# Patient Record
Sex: MALE | Race: WHITE | NOT HISPANIC OR LATINO | Employment: FULL TIME | URBAN - METROPOLITAN AREA
[De-identification: names, ages, dates, MRNs, and addresses within clinical notes are randomized per-mention and may not be internally consistent; named-entity substitution may affect disease eponyms.]

---

## 2019-09-23 ENCOUNTER — OFFICE VISIT (OUTPATIENT)
Dept: OTOLARYNGOLOGY | Facility: CLINIC | Age: 34
End: 2019-09-23
Payer: COMMERCIAL

## 2019-09-23 VITALS
SYSTOLIC BLOOD PRESSURE: 153 MMHG | DIASTOLIC BLOOD PRESSURE: 85 MMHG | RESPIRATION RATE: 16 BRPM | HEART RATE: 70 BPM | OXYGEN SATURATION: 96 %

## 2019-09-23 DIAGNOSIS — J34.2 DEVIATED NASAL SEPTUM: ICD-10-CM

## 2019-09-23 DIAGNOSIS — J35.01 CHRONIC TONSILLITIS: Primary | ICD-10-CM

## 2019-09-23 PROCEDURE — 99204 OFFICE O/P NEW MOD 45 MIN: CPT | Performed by: OTOLARYNGOLOGY

## 2019-09-23 NOTE — LETTER
9/23/2019         RE: Sanjiv Shelton  [address] [facility] [address]        Dear Colleague,    Thank you for referring your patient, Sanjiv Shelton, to the [facility]. Please see a copy of my visit note below.    History of Present Illness - Sanjiv Shelton is a 34 year old male here to see me for the first time due to tonsil problems.    This past September he had a severe URI and the tonsils got especially swollen and painful.  The LEFT more than RIGHT>  This is not a new symptom though, as he gets sore throats on a regular basis.  He will get tonsillitis on average 3-4 times per year for his entire adult life.  But this episode last September was particularly severe and lasted for over two months.  He was treated with antibiotics and that helped somewhat.  But since then, any loud speaking or any activity where his throat has to stretch his tonsils and throat hurt.    Otherwise no previous ENT surgery.  He does snore as well, and his spouse reports that he chokes and gasps as well.    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    Past Medical History - There is no problem list on file for this patient.      Current Medications -   Current Outpatient Medications:      doxycycline (VIBRAMYCIN) 100 MG capsule, Take 1 capsule by mouth 2 times daily., Disp: 28 capsule, Rfl: 0     PROAIR  (90 BASE) MCG/ACT IN AERS, INHALE 1 TO 2 PUFFS EVERY 4 TO 6 HOURS AS NEEDED, Disp: 1, Rfl: 1 YEAR    Allergies -   Allergies   Allergen Reactions     Sulfa Drugs      Allergic to sulfa eye gtts.       Social History -   Social History     Socioeconomic History     Marital status: Single     Spouse name: Not on file     Number of children: Not on file     Years of education: Not on file     Highest education level: Not on file   Occupational History     Not on file   Social Needs     Financial  resource strain: Not on file     Food insecurity:     Worry: Not on file     Inability: Not on file     Transportation needs:     Medical: Not on file     Non-medical: Not on file   Tobacco Use     Smoking status: Never Smoker   Substance and Sexual Activity     Alcohol use: Yes     Alcohol/week: 2.5 standard drinks     Types: 3 drink(s) per week     Drug use: Not on file     Sexual activity: Not on file   Lifestyle     Physical activity:     Days per week: Not on file     Minutes per session: Not on file     Stress: Not on file   Relationships     Social connections:     Talks on phone: Not on file     Gets together: Not on file     Attends Rastafarian service: Not on file     Active member of club or organization: Not on file     Attends meetings of clubs or organizations: Not on file     Relationship status: Not on file     Intimate partner violence:     Fear of current or ex partner: Not on file     Emotionally abused: Not on file     Physically abused: Not on file     Forced sexual activity: Not on file   Other Topics Concern     Not on file   Social History Narrative     Not on file       Family History - No family history on file.    Review of Systems - As per HPI and PMHx, otherwise 10+ system review of the head and neck, and general constitution is negative.    Physical Exam  BP (!) 153/85   Pulse 70   Resp 16   SpO2 96%     General - The patient is well nourished and well developed, and appears to have good nutritional status.  Alert and oriented to person and place, answers questions and cooperates with examination appropriately.   Head and Face - Normocephalic and atraumatic, with no gross asymmetry noted of the contour of the facial features.  The facial nerve is intact, with strong symmetric movements.  Voice and Breathing - The patient was breathing comfortably without the use of accessory muscles. There was no wheezing, stridor, or stertor.  The patients voice was clear and strong, and had  appropriate pitch and quality.  Ears - The tympanic membranes are normal in appearance, bony landmarks are intact.  No retraction, perforation, or masses.  No fluid or purulence was seen in the external canal or the middle ear. No evidence of infection of the middle ear or external canal, cerumen was normal in appearance.  Eyes - Extraocular movements intact, and the pupils were reactive to light.  Sclera were not icteric or injected, conjunctiva were pink and moist.  Mouth - Examination of the oral cavity showed pink, healthy oral mucosa. No lesions or ulcerations noted.  The tongue was mobile and midline, and the dentition were in good condition.    Throat - The walls of the oropharynx were smooth, pink, moist, symmetric, and had no lesions or ulcerations.  The tonsillar pillars and soft palate were symmetric.  The uvula was midline on elevation.  The tonsils are not that large, but scarred and pitted, with exudative crypts and tonsil stones visible  Neck - Normal midline excursion of the laryngotracheal complex during swallowing.  Full range of motion on passive movement.  Palpation of the occipital, submental, submandibular, internal jugular chain, and supraclavicular nodes did not demonstrate any abnormal lymph nodes or masses.  The carotid pulse was palpable bilaterally.  Palpation of the thyroid was soft and smooth, with no nodules or goiter appreciated.  The trachea was mobile and midline.  Nose - External contour is symmetric, no gross deflection or scars.  Nasal mucosa is pink and moist with no abnormal mucus.  The septum is notably deflected to the RIGHT.      A/P - Sanjiv Shelton is a 34 year old male  (J35.01) Chronic tonsillitis  (primary encounter diagnosis)    Based on the physical exam and history, my recommendation is for tonsillectomy (without adenoidectomy), and if he wishes and endoscopic septoplasty as well.  The remainder of the visit was spent discussing the risks and benefits of  tonsillectomy.  These included:  The risks of general anesthesia, bleeding, infection, possible need for emergency surgery to control bleeding, possible alteration of speech and swallowing, and even the possibility of continued throat problems following surgery.  They understood and wished to call in and schedule.    I did stress that this tonsillectomy is for the chronic infection and not snoring.        Again, thank you for allowing me to participate in the care of your patient.        Sincerely,        Jam Renae MD

## 2019-09-23 NOTE — PATIENT INSTRUCTIONS
Scheduling Information  To schedule your CT/MRI scan, please contact [facility] at [phone] OR [facility] at [phone]    To schedule your Surgery, please contact our Specialty Schedulers at [phone]      ENT Clinic Locations Clinic Hours Telephone Number     [facility]  [address] [place] [address]   Monday:           1:00pm -- 5:00pm    Friday:              8:00am - 12:00pm   To schedule/reschedule an appointment with   Dr. Renae,   please contact our   Specialty Scheduling Department at:     [phone]       [facility] [place]  [address]  [place] [address] Tuesday:          8:00am -- 2:00pm         Urgent Care Locations Clinic Hours Telephone Numbers     Leni Brenner  [address] [place] [address]     Monday-Friday:     11:00am - 9:00pm    Saturday-Sunday:  9:00am - 5:00pm   [phone]     [facility]  [address] [place] [address]     Monday-Friday:      5:00pm - 9:00pm     Saturday-Sunday:  9:00am - 5:00pm   [phone]

## 2019-09-23 NOTE — PROGRESS NOTES
History of Present Illness - Sanjiv Shelton is a 34 year old male here to see me for the first time due to tonsil problems.    This past September he had a severe URI and the tonsils got especially swollen and painful.  The LEFT more than RIGHT>  This is not a new symptom though, as he gets sore throats on a regular basis.  He will get tonsillitis on average 3-4 times per year for his entire adult life.  But this episode last September was particularly severe and lasted for over two months.  He was treated with antibiotics and that helped somewhat.  But since then, any loud speaking or any activity where his throat has to stretch his tonsils and throat hurt.    Otherwise no previous ENT surgery.  He does snore as well, and his spouse reports that he chokes and gasps as well.    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    Past Medical History - There is no problem list on file for this patient.      Current Medications -   Current Outpatient Medications:      doxycycline (VIBRAMYCIN) 100 MG capsule, Take 1 capsule by mouth 2 times daily., Disp: 28 capsule, Rfl: 0     PROAIR  (90 BASE) MCG/ACT IN AERS, INHALE 1 TO 2 PUFFS EVERY 4 TO 6 HOURS AS NEEDED, Disp: 1, Rfl: 1 YEAR    Allergies -   Allergies   Allergen Reactions     Sulfa Drugs      Allergic to sulfa eye gtts.       Social History -   Social History     Socioeconomic History     Marital status: Single     Spouse name: Not on file     Number of children: Not on file     Years of education: Not on file     Highest education level: Not on file   Occupational History     Not on file   Social Needs     Financial resource strain: Not on file     Food insecurity:     Worry: Not on file     Inability: Not on file     Transportation needs:     Medical: Not on file     Non-medical: Not on file   Tobacco Use     Smoking status: Never Smoker   Substance and Sexual Activity     Alcohol  use: Yes     Alcohol/week: 2.5 standard drinks     Types: 3 drink(s) per week     Drug use: Not on file     Sexual activity: Not on file   Lifestyle     Physical activity:     Days per week: Not on file     Minutes per session: Not on file     Stress: Not on file   Relationships     Social connections:     Talks on phone: Not on file     Gets together: Not on file     Attends Restoration service: Not on file     Active member of club or organization: Not on file     Attends meetings of clubs or organizations: Not on file     Relationship status: Not on file     Intimate partner violence:     Fear of current or ex partner: Not on file     Emotionally abused: Not on file     Physically abused: Not on file     Forced sexual activity: Not on file   Other Topics Concern     Not on file   Social History Narrative     Not on file       Family History - No family history on file.    Review of Systems - As per HPI and PMHx, otherwise 10+ system review of the head and neck, and general constitution is negative.    Physical Exam  BP (!) 153/85   Pulse 70   Resp 16   SpO2 96%     General - The patient is well nourished and well developed, and appears to have good nutritional status.  Alert and oriented to person and place, answers questions and cooperates with examination appropriately.   Head and Face - Normocephalic and atraumatic, with no gross asymmetry noted of the contour of the facial features.  The facial nerve is intact, with strong symmetric movements.  Voice and Breathing - The patient was breathing comfortably without the use of accessory muscles. There was no wheezing, stridor, or stertor.  The patients voice was clear and strong, and had appropriate pitch and quality.  Ears - The tympanic membranes are normal in appearance, bony landmarks are intact.  No retraction, perforation, or masses.  No fluid or purulence was seen in the external canal or the middle ear. No evidence of infection of the middle ear or  external canal, cerumen was normal in appearance.  Eyes - Extraocular movements intact, and the pupils were reactive to light.  Sclera were not icteric or injected, conjunctiva were pink and moist.  Mouth - Examination of the oral cavity showed pink, healthy oral mucosa. No lesions or ulcerations noted.  The tongue was mobile and midline, and the dentition were in good condition.    Throat - The walls of the oropharynx were smooth, pink, moist, symmetric, and had no lesions or ulcerations.  The tonsillar pillars and soft palate were symmetric.  The uvula was midline on elevation.  The tonsils are not that large, but scarred and pitted, with exudative crypts and tonsil stones visible  Neck - Normal midline excursion of the laryngotracheal complex during swallowing.  Full range of motion on passive movement.  Palpation of the occipital, submental, submandibular, internal jugular chain, and supraclavicular nodes did not demonstrate any abnormal lymph nodes or masses.  The carotid pulse was palpable bilaterally.  Palpation of the thyroid was soft and smooth, with no nodules or goiter appreciated.  The trachea was mobile and midline.  Nose - External contour is symmetric, no gross deflection or scars.  Nasal mucosa is pink and moist with no abnormal mucus.  The septum is notably deflected to the RIGHT.      A/P - Sanjiv Shelton is a 34 year old male  (J35.01) Chronic tonsillitis  (primary encounter diagnosis)    Based on the physical exam and history, my recommendation is for tonsillectomy (without adenoidectomy), and if he wishes and endoscopic septoplasty as well.  The remainder of the visit was spent discussing the risks and benefits of tonsillectomy.  These included:  The risks of general anesthesia, bleeding, infection, possible need for emergency surgery to control bleeding, possible alteration of speech and swallowing, and even the possibility of continued throat problems following surgery.  They  understood and wished to call in and schedule.    I did stress that this tonsillectomy is for the chronic infection and not snoring.

## 2019-09-24 ENCOUNTER — TELEPHONE (OUTPATIENT)
Dept: OTOLARYNGOLOGY | Facility: CLINIC | Age: 34
End: 2019-09-24

## 2019-09-24 NOTE — TELEPHONE ENCOUNTER
I spoke with patient and he has questions regarding his aftercare and 5 weeks of no strenuous exercise. 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 Will this be ok. Please call and advise. Thank you

## 2019-09-24 NOTE — TELEPHONE ENCOUNTER
Called and left a voicemail.  Surgery would be tonsillectomy and Endoscopic septoplasty with turbinate reduction.  Patient would be able to return to full intensity cardio two weeks after surgery, just no contact sports for 5 weeks after surgery.    Call if any further questions.

## 2019-10-01 ENCOUNTER — TELEPHONE (OUTPATIENT)
Dept: OTOLARYNGOLOGY | Facility: CLINIC | Age: 34
End: 2019-10-01

## 2019-10-02 NOTE — TELEPHONE ENCOUNTER
Type of surgery: ENDOSCOPIC SEPTOPLASTY NOSE WITH TURBINOPLASTY  CPT 52268, 09620  Chronic tonsillitis [J35.01]  - Primary   Location of surgery: [facility]  Date and time of surgery: 11/26/2019  Surgeon: ALVARO  Pre-Op Appt Date: Patient will scheduled with Emmy Burr   Post-Op Appt Date: 12/02/2019   Packet sent out: Yes  Pre-cert/Authorization completed:   Per Availity:  Procedure Code 1  55049    Reference Number  [id number]  Status  NO AUTH REQUIRED    Medical Policy Information or Criteria  XI-03 Site of Service for Selected Outpatient Procedures    Procedure Code 1  99479    Reference Number  [id number]  Status  NO AUTH REQUIRED    Date: 10/03/2019    Thank you,   Massiel Smith   Referral Department  [phone]

## 2019-11-25 ENCOUNTER — ANESTHESIA EVENT (OUTPATIENT)
Dept: SURGERY | Facility: AMBULATORY SURGERY CENTER | Age: 34
End: 2019-11-25

## 2019-11-25 ASSESSMENT — MIFFLIN-ST. JEOR: SCORE: 2004

## 2019-11-26 ENCOUNTER — ANESTHESIA (OUTPATIENT)
Dept: SURGERY | Facility: AMBULATORY SURGERY CENTER | Age: 34
End: 2019-11-26
Payer: COMMERCIAL

## 2019-11-26 ENCOUNTER — NURSE TRIAGE (OUTPATIENT)
Dept: NURSING | Facility: CLINIC | Age: 34
End: 2019-11-26

## 2019-11-26 ENCOUNTER — HOSPITAL ENCOUNTER (OUTPATIENT)
Facility: AMBULATORY SURGERY CENTER | Age: 34
Discharge: HOME | End: 2019-11-26
Attending: OTOLARYNGOLOGY | Admitting: OTOLARYNGOLOGY
Payer: COMMERCIAL

## 2019-11-26 VITALS
TEMPERATURE: 97 F | HEIGHT: 72 IN | BODY MASS INDEX: 30.49 KG/M2 | OXYGEN SATURATION: 97 % | RESPIRATION RATE: 16 BRPM | DIASTOLIC BLOOD PRESSURE: 85 MMHG | HEART RATE: 99 BPM | WEIGHT: 225.09 LBS | SYSTOLIC BLOOD PRESSURE: 134 MMHG

## 2019-11-26 DIAGNOSIS — J35.01 CHRONIC TONSILLITIS: ICD-10-CM

## 2019-11-26 DIAGNOSIS — J34.2 DEVIATED NASAL SEPTUM: ICD-10-CM

## 2019-11-26 PROCEDURE — 30520 REPAIR OF NASAL SEPTUM: CPT | Performed by: OTOLARYNGOLOGY

## 2019-11-26 PROCEDURE — 42826 REMOVAL OF TONSILS: CPT

## 2019-11-26 PROCEDURE — 30520 REPAIR OF NASAL SEPTUM: CPT

## 2019-11-26 PROCEDURE — G8916 PT W IV AB GIVEN ON TIME: HCPCS

## 2019-11-26 PROCEDURE — 42826 REMOVAL OF TONSILS: CPT | Mod: 51 | Performed by: OTOLARYNGOLOGY

## 2019-11-26 PROCEDURE — 30140 RESECT INFERIOR TURBINATE: CPT | Mod: 50 | Performed by: OTOLARYNGOLOGY

## 2019-11-26 PROCEDURE — G8907 PT DOC NO EVENTS ON DISCHARG: HCPCS

## 2019-11-26 PROCEDURE — 88304 TISSUE EXAM BY PATHOLOGIST: CPT | Performed by: OTOLARYNGOLOGY

## 2019-11-26 PROCEDURE — 30140 RESECT INFERIOR TURBINATE: CPT | Mod: 50

## 2019-11-26 RX ORDER — PROPOFOL 10 MG/ML
INJECTION, EMULSION INTRAVENOUS PRN
Status: DISCONTINUED | OUTPATIENT
Start: 2019-11-26 | End: 2019-11-26

## 2019-11-26 RX ORDER — MEPERIDINE HYDROCHLORIDE 25 MG/ML
12.5 INJECTION INTRAMUSCULAR; INTRAVENOUS; SUBCUTANEOUS
Status: DISCONTINUED | OUTPATIENT
Start: 2019-11-26 | End: 2019-11-27 | Stop reason: HOSPADM

## 2019-11-26 RX ORDER — OXYCODONE HYDROCHLORIDE 5 MG/1
5 TABLET ORAL EVERY 4 HOURS PRN
Qty: 42 TABLET | Refills: 0 | Status: SHIPPED | OUTPATIENT
Start: 2019-11-26 | End: 2019-11-26

## 2019-11-26 RX ORDER — GABAPENTIN 300 MG/1
300 CAPSULE ORAL ONCE
Status: COMPLETED | OUTPATIENT
Start: 2019-11-26 | End: 2019-11-26

## 2019-11-26 RX ORDER — FENTANYL CITRATE 50 UG/ML
25-50 INJECTION, SOLUTION INTRAMUSCULAR; INTRAVENOUS
Status: DISCONTINUED | OUTPATIENT
Start: 2019-11-26 | End: 2019-11-27 | Stop reason: HOSPADM

## 2019-11-26 RX ORDER — OXYCODONE HYDROCHLORIDE 5 MG/1
5-10 TABLET ORAL EVERY 4 HOURS PRN
Status: DISCONTINUED | OUTPATIENT
Start: 2019-11-26 | End: 2019-11-27 | Stop reason: HOSPADM

## 2019-11-26 RX ORDER — LIDOCAINE 40 MG/G
CREAM TOPICAL
Status: DISCONTINUED | OUTPATIENT
Start: 2019-11-26 | End: 2019-11-27 | Stop reason: HOSPADM

## 2019-11-26 RX ORDER — HYDROMORPHONE HYDROCHLORIDE 1 MG/ML
.3-.5 INJECTION, SOLUTION INTRAMUSCULAR; INTRAVENOUS; SUBCUTANEOUS EVERY 10 MIN PRN
Status: DISCONTINUED | OUTPATIENT
Start: 2019-11-26 | End: 2019-11-27 | Stop reason: HOSPADM

## 2019-11-26 RX ORDER — FENTANYL CITRATE 50 UG/ML
INJECTION, SOLUTION INTRAMUSCULAR; INTRAVENOUS PRN
Status: DISCONTINUED | OUTPATIENT
Start: 2019-11-26 | End: 2019-11-26

## 2019-11-26 RX ORDER — CEFAZOLIN SODIUM 2 G/100ML
2 INJECTION, SOLUTION INTRAVENOUS
Status: COMPLETED | OUTPATIENT
Start: 2019-11-26 | End: 2019-11-26

## 2019-11-26 RX ORDER — SODIUM CHLORIDE, SODIUM LACTATE, POTASSIUM CHLORIDE, CALCIUM CHLORIDE 600; 310; 30; 20 MG/100ML; MG/100ML; MG/100ML; MG/100ML
INJECTION, SOLUTION INTRAVENOUS CONTINUOUS
Status: DISCONTINUED | OUTPATIENT
Start: 2019-11-26 | End: 2019-11-27 | Stop reason: HOSPADM

## 2019-11-26 RX ORDER — ONDANSETRON 2 MG/ML
INJECTION INTRAMUSCULAR; INTRAVENOUS PRN
Status: DISCONTINUED | OUTPATIENT
Start: 2019-11-26 | End: 2019-11-26

## 2019-11-26 RX ORDER — CEFAZOLIN SODIUM 1 G/3ML
1 INJECTION, POWDER, FOR SOLUTION INTRAMUSCULAR; INTRAVENOUS SEE ADMIN INSTRUCTIONS
Status: DISCONTINUED | OUTPATIENT
Start: 2019-11-26 | End: 2019-11-27 | Stop reason: HOSPADM

## 2019-11-26 RX ORDER — ALBUTEROL SULFATE 0.83 MG/ML
2.5 SOLUTION RESPIRATORY (INHALATION) EVERY 4 HOURS PRN
Status: DISCONTINUED | OUTPATIENT
Start: 2019-11-26 | End: 2019-11-27 | Stop reason: HOSPADM

## 2019-11-26 RX ORDER — ONDANSETRON 8 MG/1
8 TABLET, ORALLY DISINTEGRATING ORAL EVERY 8 HOURS PRN
Qty: 20 TABLET | Refills: 1 | Status: SHIPPED | OUTPATIENT
Start: 2019-11-26 | End: 2020-06-01

## 2019-11-26 RX ORDER — OXYCODONE HYDROCHLORIDE 5 MG/1
5 TABLET ORAL EVERY 4 HOURS PRN
Qty: 42 TABLET | Refills: 0 | Status: SHIPPED | OUTPATIENT
Start: 2019-11-26 | End: 2020-06-01

## 2019-11-26 RX ORDER — KETOROLAC TROMETHAMINE 30 MG/ML
30 INJECTION, SOLUTION INTRAMUSCULAR; INTRAVENOUS EVERY 6 HOURS PRN
Status: DISCONTINUED | OUTPATIENT
Start: 2019-11-26 | End: 2019-11-27 | Stop reason: HOSPADM

## 2019-11-26 RX ORDER — DEXAMETHASONE SODIUM PHOSPHATE 4 MG/ML
4 INJECTION, SOLUTION INTRA-ARTICULAR; INTRALESIONAL; INTRAMUSCULAR; INTRAVENOUS; SOFT TISSUE EVERY 10 MIN PRN
Status: DISCONTINUED | OUTPATIENT
Start: 2019-11-26 | End: 2019-11-27 | Stop reason: HOSPADM

## 2019-11-26 RX ORDER — ONDANSETRON 2 MG/ML
4 INJECTION INTRAMUSCULAR; INTRAVENOUS EVERY 30 MIN PRN
Status: DISCONTINUED | OUTPATIENT
Start: 2019-11-26 | End: 2019-11-27 | Stop reason: HOSPADM

## 2019-11-26 RX ORDER — AMOXICILLIN AND CLAVULANATE POTASSIUM 600; 42.9 MG/5ML; MG/5ML
600 POWDER, FOR SUSPENSION ORAL 2 TIMES DAILY
Qty: 70 ML | Refills: 0 | Status: SHIPPED | OUTPATIENT
Start: 2019-11-26 | End: 2019-12-03

## 2019-11-26 RX ORDER — LIDOCAINE HYDROCHLORIDE AND EPINEPHRINE 10; 10 MG/ML; UG/ML
INJECTION, SOLUTION INFILTRATION; PERINEURAL PRN
Status: DISCONTINUED | OUTPATIENT
Start: 2019-11-26 | End: 2019-11-26 | Stop reason: HOSPADM

## 2019-11-26 RX ORDER — ONDANSETRON 4 MG/1
4 TABLET, ORALLY DISINTEGRATING ORAL EVERY 30 MIN PRN
Status: DISCONTINUED | OUTPATIENT
Start: 2019-11-26 | End: 2019-11-27 | Stop reason: HOSPADM

## 2019-11-26 RX ORDER — LIDOCAINE HYDROCHLORIDE 20 MG/ML
INJECTION, SOLUTION INFILTRATION; PERINEURAL PRN
Status: DISCONTINUED | OUTPATIENT
Start: 2019-11-26 | End: 2019-11-26

## 2019-11-26 RX ORDER — ACETAMINOPHEN 325 MG/1
975 TABLET ORAL ONCE
Status: COMPLETED | OUTPATIENT
Start: 2019-11-26 | End: 2019-11-26

## 2019-11-26 RX ORDER — DEXAMETHASONE SODIUM PHOSPHATE 4 MG/ML
10 INJECTION, SOLUTION INTRA-ARTICULAR; INTRALESIONAL; INTRAMUSCULAR; INTRAVENOUS; SOFT TISSUE ONCE
Status: COMPLETED | OUTPATIENT
Start: 2019-11-26 | End: 2019-11-26

## 2019-11-26 RX ORDER — LIDOCAINE HYDROCHLORIDE AND EPINEPHRINE BITARTRATE 20; .01 MG/ML; MG/ML
INJECTION, SOLUTION SUBCUTANEOUS PRN
Status: DISCONTINUED | OUTPATIENT
Start: 2019-11-26 | End: 2019-11-26 | Stop reason: HOSPADM

## 2019-11-26 RX ORDER — NALOXONE HYDROCHLORIDE 0.4 MG/ML
.1-.4 INJECTION, SOLUTION INTRAMUSCULAR; INTRAVENOUS; SUBCUTANEOUS
Status: DISCONTINUED | OUTPATIENT
Start: 2019-11-26 | End: 2019-11-27 | Stop reason: HOSPADM

## 2019-11-26 RX ADMIN — PROPOFOL 200 MG: 10 INJECTION, EMULSION INTRAVENOUS at 13:24

## 2019-11-26 RX ADMIN — ONDANSETRON 4 MG: 2 INJECTION INTRAMUSCULAR; INTRAVENOUS at 14:33

## 2019-11-26 RX ADMIN — DEXAMETHASONE SODIUM PHOSPHATE 10 MG: 4 INJECTION, SOLUTION INTRA-ARTICULAR; INTRALESIONAL; INTRAMUSCULAR; INTRAVENOUS; SOFT TISSUE at 13:38

## 2019-11-26 RX ADMIN — GABAPENTIN 300 MG: 300 CAPSULE ORAL at 12:34

## 2019-11-26 RX ADMIN — CEFAZOLIN SODIUM 2 G: 2 INJECTION, SOLUTION INTRAVENOUS at 13:20

## 2019-11-26 RX ADMIN — FENTANYL CITRATE 50 MCG: 50 INJECTION, SOLUTION INTRAMUSCULAR; INTRAVENOUS at 13:23

## 2019-11-26 RX ADMIN — SODIUM CHLORIDE, SODIUM LACTATE, POTASSIUM CHLORIDE, CALCIUM CHLORIDE: 600; 310; 30; 20 INJECTION, SOLUTION INTRAVENOUS at 13:15

## 2019-11-26 RX ADMIN — ACETAMINOPHEN 975 MG: 325 TABLET ORAL at 12:34

## 2019-11-26 RX ADMIN — ONDANSETRON 4 MG: 2 INJECTION INTRAMUSCULAR; INTRAVENOUS at 15:52

## 2019-11-26 RX ADMIN — OXYCODONE HYDROCHLORIDE 5 MG: 5 TABLET ORAL at 15:32

## 2019-11-26 RX ADMIN — Medication 1 MG: at 13:45

## 2019-11-26 RX ADMIN — Medication 50 MG: at 13:24

## 2019-11-26 RX ADMIN — LIDOCAINE HYDROCHLORIDE 3 ML: 20 INJECTION, SOLUTION INFILTRATION; PERINEURAL at 13:24

## 2019-11-26 RX ADMIN — FENTANYL CITRATE 50 MCG: 50 INJECTION, SOLUTION INTRAMUSCULAR; INTRAVENOUS at 13:36

## 2019-11-26 ASSESSMENT — LIFESTYLE VARIABLES: TOBACCO_USE: 0

## 2019-11-26 NOTE — ANESTHESIA POSTPROCEDURE EVALUATION
Anesthesia POST Procedure Evaluation    Patient: Sanjiv Shelton   MRN:     [id number] Gender:   male   Age:    34 year old :      1985        Preoperative Diagnosis: Chronic tonsillitis [J35.01]  Deviated nasal septum [J34.2]   Procedure(s):  ENDOSCOPIC SEPTOPLASTY, NOSE, WITH TURBINOPLASTY  Bilateral Tonsillectomy   Postop Comments: No value filed.       Anesthesia Type:  Not documented  General    Reportable Event: NO     PAIN: Uncomplicated   Sign Out status: Comfortable, Well controlled pain     PONV: No PONV   Sign Out status:  No Nausea or Vomiting     Neuro/Psych: Uneventful perioperative course   Sign Out Status: Preoperative baseline; Age appropriate mentation     Airway/Resp.: Uneventful perioperative course   Sign Out Status: Non labored breathing, age appropriate RR; Resp. Status within EXPECTED Parameters     CV: Uneventful perioperative course   Sign Out status: Appropriate BP and perfusion indices; Appropriate HR/Rhythm     Disposition:   Sign Out in:  PACU  Disposition:  Phase II; Home  Recovery Course: Uneventful  Follow-Up: Not required           Last Anesthesia Record Vitals:  CRNA VITALS  2019 1428 - 2019 1519      2019             NIBP:  (!) 158/116    Pulse:  86    NIBP Mean:  136    SpO2:  100 %    Resp Rate (observed):  22          Last PACU Vitals:  Vitals Value Taken Time   /103 2019  3:15 PM   Temp 97.4  F (36.3  C) 2019  3:00 PM   Pulse 92 2019  3:15 PM   Resp 8 2019  3:18 PM   SpO2 100 % 2019  3:18 PM   Temp src     NIBP     Pulse     SpO2     Resp     Temp     Ht Rate     Temp 2     Vitals shown include unvalidated device data.      Electronically Signed By: Spencer Lozoya DO, 2019, 3:19 PM

## 2019-11-26 NOTE — ANESTHESIA CARE TRANSFER NOTE
Patient: Sanjiv Shelton    Procedure(s):  ENDOSCOPIC SEPTOPLASTY, NOSE, WITH TURBINOPLASTY  Bilateral Tonsillectomy    Diagnosis: Chronic tonsillitis [J35.01]  Deviated nasal septum [J34.2]  Diagnosis Additional Information: No value filed.    Anesthesia Type:   General     Note:  Airway :Face Mask  Patient transferred to:PACU  Comments: Awake, comfortable, sats 99%< Report to RN.Handoff Report: Identifed the Patient, Identified the Reponsible Provider, Reviewed the pertinent medical history, Discussed the surgical course, Reviewed Intra-OP anesthesia mangement and issues during anesthesia, Set expectations for post-procedure period and Allowed opportunity for questions and acknowledgement of understanding      Vitals: (Last set prior to Anesthesia Care Transfer)    CRNA VITALS  11/26/2019 1428 - 11/26/2019 1501      11/26/2019             NIBP:  (!) 158/116    Pulse:  86    NIBP Mean:  136    SpO2:  100 %    Resp Rate (observed):  22                Electronically Signed By: KATERYNA Jasso CRNA  November 26, 2019  3:01 PM

## 2019-11-26 NOTE — OP NOTE
PREOPERATIVE DIAGNOSES:   1. Deviated nasal septum.   2. Turbinate hypertrophy.   3. Nasal obstruction.   4. Chronic tonsillitis    POSTOPERATIVE DIAGNOSES:   1. Deviated nasal septum.   2. Turbinate hypertrophy.   3. Nasal obstruction.   4. Chronic tonsillitis    PROCEDURES PERFORMED:   1. Endoscopically assisted septoplasty with reimplantation of cartilage.   2. Bilateral submucous resection of inferior turbinates.   3. Bilateral tonsillectomy    SURGEON: Jam Renae MD   ASSISTANT: None  BLOOD LOSS: 10 mL.   COMPLICATIONS: None.   SPECIMENS: None.   IMPLANTS: None  ANESTHESIA: GETA.   INDICATIONS: Sanjiv Shelton  presented to me with a lifelong history of chronic nasal obstruction as well as chronic tonsillitis.  On evaluation, the patient had severely deviated septum and turbinate hypertrophy. Therefore, my recommendation was for the above-named procedures. Preoperatively, risks discussed included the risks of infection, bleeding, the risks of general anesthesia, possible injury to the eyes, base of skull and tear duct system, and possible failure of the surgery to achieve the desired result. The patient understood these risks and possible outcomes and wished to proceed.   OPERATIVE PROCEDURE: After being taken to the operating room and induction of general endotracheal tube anesthesia, the bed was rotated 90 degrees.  After that, he was prepped and draped in the normal clean fashion. I began by applying topical anesthetic in the form of 2 cottonoids on each side of the nose which had been soaked with a total of 4 mL of 4% liquid cocaine. In addition, I injected 0.25% Marcaine with 1:100,000 epinephrine into the base of the columella as well as the anterior insertion of the inferior turbinates bilaterally in preparation for later submucous resection.    We began with the tonsillectomy,a shoulder roll and head turban were placed. I suspended the patient from the [place] stand using a Jaylene-Ventura  mouthgag, and I grasped the right tonsil with an Allis forceps and retracted medially and performed subcapsular dissection utilizing monopolar cautery, and the right tonsil came out very smoothly. I then turned my attention to the left side, once again using an Allis forceps to grasp it and retract it medially, and then I performed subcapsular dissection, and the left tonsil also came out very smoothly. I released the mouthgag for 2 minutes to allow recirculation of blood to the tongue.   I resuspended the patient from the [place] stand using a Jaylene-Ventura mouthgag,  and there was good hemostasis. But at this point I noted that in removing the tonsils thoroughly the uvula had been undermined and was already turning quite dusky so I amputated it at the base and over stitched the stump with a vicryl.   I applied a thin film of the hemostatic powder to the tonsil beds bilaterally and removed the mouthgag. The bed was rotated 90 degrees after I removed the shoulder roll and head turban, and now moved on to the nasal portion of the surgery.    I removed the cottonoids from the right side of the nose and entered the right nasal cavity.   I made a septoplasty incision in the right nasal vestibule in a traditional open fashion. I then dissected down onto the left side of the cartilaginous septum through the right-sided incision. I then was able to start a mucoperichondrial pocket directly on the left side of the cartilaginous septum and inserted 0-degree endoscope to form my pocket and under direct endoscopic visualization. After I completely elevated the mucoperichondrium off the left side of the septum, I then made a hemitransfixion incision through the cartilage approximately 1.5 cm back from the anterior edge. I broke over to the right side and raised a submucoperiosteal flap on the entire right side of the nasal septum under direct endoscopic visualization. I was able to carefully tease the mucoperichondrium off the large  rightward-pointing spur. After this was done, I used a swivel knife to remove the entire rhomboid portion of the cartilaginous septum, and I removed it in one large piece. It was brought to the back table and crushed in 2 pieces and then reinserted back into the mucoperichondrial pocket. I laid the flaps back together and this significantly improved the left nasal airway. I then closed my septoplasty incision with 3 simple interrupted 4-0 chromic gut sutures.     I proceeded to the final components of the operation, which was submucous resection of inferior turbinates. I switched to a 2 inferior turbinate blade and started on the left side. I made a stab incision at the anterior insertion of the left inferior turbinate and raised a submucoperiosteal tunnel along the medial surface of the left inferior turbinate bone. I then slowly withdrew the shaver blade as I ran the shaver to perform my submucous resection.   I then performed the same procedure on the right side, once again using the 2 mm turbinate blade to make a stab incision at the anterior insertion of the right inferior turbinate blade. I raised a submucoperiosteal tunnel along the entire medial surface of the right inferior turbinate bone and slowly withdrew the shaver as I ran the shaver function to perform submucous resection.   At this point, the entire procedure was now complete. I reinspected both sides of the nose and there was good hemostasis.  All instruments were accounted for and all counts were correct. The patient's bed was rotated 90 degrees back to the care of anesthesia. He was awakened, extubated and sent to the recovery room in good condition.

## 2019-11-26 NOTE — DISCHARGE INSTRUCTIONS
[facility]  Same-Day Surgery   Adult Discharge Orders & Instructions   For 24 hours after surgery  1. Get plenty of rest.  A responsible adult must stay with you for at least 24 hours after you leave the hospital.   2. Do not drive or use heavy equipment.  If you have weakness or tingling, don't drive or use heavy equipment until this feeling goes away.  3. Do not drink alcohol.  4. Avoid strenuous or risky activities.  Ask for help when climbing stairs.   5. You may feel lightheaded.  IF so, sit for a few minutes before standing.  Have someone help you get up.   6. If you have nausea (feel sick to your stomach): Drink only clear liquids such as apple juice, ginger ale, broth or 7-Up.  Rest may also help.  Be sure to drink enough fluids.  Move to a regular diet as you feel able.  7. You may have a slight fever. Call the doctor if your fever is over 100 F (37.7 C) (taken under the tongue) or lasts longer than 24 hours.  8. You may have a dry mouth, a sore throat, muscle aches or trouble sleeping.  These should go away after 24 hours.  9. Do not make important or legal decisions.   Call your doctor for any of the followin.  Signs of infection (fever, growing tenderness at the surgery site, a large amount of drainage or bleeding, severe pain, foul-smelling drainage, redness, swelling).    2. It has been over 8 to 10 hours since surgery and you are still not able to urinate (pass water).    3.  Headache for over 24 hours.         To contact Dr Yoon call:  [phone]    Tylenol was given at 12:30 PM  Instructions for Septoplasty    Recovery - Everyone recovers differently from a general anesthetic.  Symptoms such as fatigue, nausea, lightheadedness, and sometimes a low grade fever (up to 100 degrees) are not unusual.  As your body removes the anesthetic drugs from circulation, these symptoms will resolve.  Your nose will be sore and throbbing after surgery, and you may even have symptoms  similar to a sinus infection with headache, congestion, and pressure.  These will resolve with healing.  For several days you may experience bloody drainage from the nose, please use the drip pad as necessary for this.  If there is persistent bleeding, please call the office during business hours or the on call ENT physician after hours.  It is common for the front teeth to ache after septoplasty and sinus surgery.  This resolves with healing.  There are no diet restrictions after septoplasty, and you can resume your home medications.  Please blow your nose very very gently for two weeks after surgery.  Limit your activity to no strenuous activities until I see you for the first follow up visit, and sleep with your head elevated.    Medications - You were sent home with narcotic pain medication.  If you can tolerate the discomfort during your recovery by using just plain Tylenol or ibuprofen (advil), please do so.  However, do not hesitate to use the stronger pain medication if needed.  If you were sent home with an antibiotic, it is primarily used to help the healing process.  If it causes loose bowel movements or other signs of intolerance, it is appropriate to discontinue it.      Complications - Problems related to septoplasty almost always are detected during the operation, and special instruction will be given in that situation.  However, unexpected things can happen.  If you experience persistent bleeding, fevers, changes in vision, and severe headaches, this may be the sign of an infection.  Any of these symptoms should be called into my office or to the on call ENT if after hours.   The most common non-emergency distant complication of septoplasty is the septum healing crooked.  Although rare, this can happen.  There may be small plastic pieces placed inside your nose during surgery (splints).  These help to promote the septum into healing in its straightened position, and will be removed at the follow up  visit.    Follow up - As mentioned, splints may be removed at the follow up visit.  This is not as bad as it sounds.  And afterwards, the improvement you will expereince in breathing from the septoplasty is usually dramatic and immediate.  I will then see you 4 to 6 weeks after that visit to make sure that everything has healed appropriately.    If there are any questions or issues with the above, or if there are other issues that concern you, always feel free to call the clinic and I am happy to speak with you as soon as I can.    Ramu Renae MD  Otolaryngology  [facility]  [phone] After hours, [facility] option    Postoperative Care for Tonsillectomy (with or without adenoidectomy)    Recovery - There are a handful of issues that routinely occur during recover that should be anticipated during your recovery.    1. The pain and swelling almost always gets worse before it gets better, this is normal.  Usually it peaks 3 to 5 days after the surgery, and then begins improving at 7 to 8 days after surgery.  Of course, this is variable from person to person.  2. The only dietary restriction is avoidance of hard or crunchy things until I see you in follow up.  If it makes a noise when you bite it, it is too hard.  Although it is good to begin eating again from day one, it is not unusual to not eat for several days after the procedure.  The most important thing is staying hydrated.  Drink fluids with electrolytes if possible, such as sports drinks.  3. The pain medication you were sent home with can make some people very nauseated.  To minimize this, avoid taking it on an empty stomach, or take smaller does with greater frequency.  For example if your dose is 2 teaspoons every four hours, try taking one teaspoon every two hours, etc.  4. Antibiotic are sometimes given after surgery, not to prevent infection, but some research shows that it helps to decrease pain.  This is not absolutely  proven, and therefore is not absolutely necessary.   5. Try to stay ahead of the pain.  In other words, do not wait for pain medication to completely wear off before taking more pain medicine.  Instead, take the medication every 4 to 6 hours, even if it requires setting an alarm clock at night.  This is especially helpful during the first 5 days.  6. The uvula ( the small hanging object in the back of your mouth) frequently swells up after tonsillectomy, but will go back to normal.  This swelling can temporarily cause the sensation of something being stuck in your throat, it will go away with recovery.  Also, because of the arrangement of nerves under where the tonsils were, sharp ear pain is very common during recovery, and will also go away with recovery.   7. With adenoidectomy, a very strong and foul-smelling odor can occur about 4-7 days after surgery.  This fades rapidly, and unless there is an associated fever no antibiotics are necessary.  8. It is very common after tonsillectomy to experience ear pain. This is due to nerves on the side of the throat becoming inflamed, and causing the perception of sudden episodes of ear pain.  This can be controlled with the same pain medication given for the surgery.     Activity - Avoid heavy lifting (greater than 20 pounds), strenuous exercise, or extremely cold environments until the follow up appointment.  Also, try to sleep with your head elevated.  An irritated cough from the breathing tube is fairly normal after surgery.    Medications - Except blood thinners, almost all medication can be re-started after tonsillectomy.      Complications - Bleeding is by far the most common complication after tonsillectomy.  If there are a few small drops or streaks of blood in the saliva that then goes away, this can be conservatively watched.  Gentle gargling with the ice water can also help stop this minor bleeding.  However, if the bleeding is persistent, or heavy bleeding  occurs, do not hesitate.  Go to the emergency room to be evaluated.    Follow up - I like to see my patients about 2 weeks after the procedure to make sure that everything is healing appropriately.  Occasionally, there can be some longer - lasting side effects of surgery such as abnormal tongue sensations, or unusual swallowing.  However, if everything is healing well, the 2 week postoperative visit is all that will be necessary.    If there are any questions or issues with the above, or if there are other issues that concern you, always feel free to call the clinic and I am happy to speak with you as soon as I can.    Ramu Renae MD   Otolaryngology  [facility]  [phone] After hours, [facility] option      Tonsillectomy and Adenoidectomy    What is a tonsillectomy and adenoidectomy?    Tonsillectomy is removal of the tonsils. Adenoidectomy is removal of the adenoids. Tonsils and adenoids are lumps of tissue at the back of the throat. The tonsils and adenoids fight infection. Your child may need the tonsillectomy if he has many bad colds, sore throats, or ear infections. Tonsillectomy and adenoidectomy (T&A) are often done together.    What can I expect after Surgery?    It is common to have an upset stomach and vomiting during the first 24 hours after surgery.    Your child s throat may be sore for two weeks, especially when eating. The soreness may get better after a few days and then get worse again. Your child s voice may change a little after surgery.    Ear pain is common, often when swallowing, because the ear and throat have a common sensory nerve. Jaw spasms, or uncontrollable movement of the jaw, may also occur and cause pain.    Neck soreness is common after an adenoidectomy and usually last about one week.    Your child will have bad breath for a few weeks because your child s throat is swollen, snoring is common after surgery but should go away after about two  weeks.    How should I care for my child?    Encourage your child to drink plenty of liquids (at least 2 -3 ounces per hour)  keeping the throat moist decreases discomfort and prevents dehydration( a  dangerous condition in which the body gets dried out.)    Give pain medication regularly within the limits directed by your doctor. Give  it before bed and first thing after waking in the morning. Try to give the   pain medication 30 minutes before meals to help make swallowing easier.    To prevent bleeding, avoid coughing, nose-blowing, clearing throat, and   spitting. Wipe nose gently if needed. When sneezing, encourage your child to   Open the mouth and make a sound, to prevent pressure buildup.    Avoid coming in contact with people who have colds, flu, or infections.      What can my child eat?    The day of surgery, give only cool, Clear liquids such as:    ? Apple Juice  ? Jell-o*  ? Gene-aid*  ? Popsicles*  ? Flat pop (remove bubbles)  ? Water    If your child has an upset stomach, give small amounts often. Note: If   Your child vomits after drinking red liquids the vomit will be the same  color.    After the first day, when your child wants them add dairy and soft foods such as:  ? Ice cream  ? Milk shakes(use spoon)  ? Pudding  ? Smooth yogurt  Liquids are more important than food.    Be sure your child is drinking a lot.    When your child wants them, add soft foods (foods without rough  edges). See the chart for ideas. If a food is not on the list ask yourself:    Is it easy to chew? Is it free of coarse, rough, or crispy edges?  If the answer  is yes, your child can probably eat it.    Be sure to cut foods very small and encourage your child to chew them  well. Continue the soft diet for 2 weeks after surgery Avoid citrus fruits and juices   such as orange juice and lemonade, as they may sting your child s throat. Avoid  foods that are hot in temperature or spicy hot.                               May Eat  Should not eat   - Soft bread  - Soggy waffles or   Spanish toast (no crusts).  Soaked in syrup  - Pancakes  - Scrambled or   poached egg   - Toast  - Crispy waffles  - Fried foods   - Oatmeal,or   Creamy cereal  - Soggy cold cereal  (soaked in milk   - Crunchy cold   cereal   - Soup  - Hot dogs  - Hamburgers  - Tender, moist  meat  - Pasta, noodles  - Spaghetti-Os  - Macaroni and  Cheese   - Tough, dry meat,  chicken or fish   - Milk  - Custard, pudding  - Ice cream  - Malts, shakes  - Yogurt (smooth)  - Cottage cheese   - Cookies  - Crackers  - Pretzels  - Chips  - Popcorn  - Nuts   - Sandwiches, (no crusts)  - Smooth peanut butter   and jelly  - Processed cheese  - Tuna - Grilled cheese  sandwiches   - Cooked vegetables  - Mashed potatoes - Raw vegetables   - Tomatoes   - Applesauce  - Bananas  - Canned fruits  - Watermelon with out  seeds - Citrus fruits  - Moist fresh fruits   - Juices (not citrus)  - Gene aid  - Flat pop (no bubbles)  - Jell-O - Citrus juices  - Pop with bubbles         ankle pump exercises: This particular exercise is important because it helps decrease the swelling in the knee and lower leg.  It s also very important in helping you avoid developing blood clots in your lower leg(s) after surgery.   To do an ankle pump you point and flex your foot back and forth.  You should do 10 repetitions several times during the day. You really can t over do these.    Deep breathing and coughing:  It's important to learn deep breathing and coughing exercises as these will help to lower your risk of lung complications after your surgery.  Breathing deeply:  Moves air down to the bottom areas of the lungs   Opens air passages and moves mucous out (coughing is also easier)   Helps the blood and oxygen supply to your lungs, boosting circulation   Lowers the risk of lung complications such as pneumonia and infections  Breathe in deeply and slowly through your nose, expanding your lower rib cage, and letting  your abdomen move forward. Hold for a count of 3 to 5. Breathe out slowly and completely.  Don't force your breath out. On the third breath, cough deeply from the lungs, not the throat.  Rest and repeat every hour while you are awake.  Managing Your Pain   Pain management is an important part of your care. When you are in pain or uncomfortable, it can affect the way you feel both physically and emotionally.   The longer pain goes untreated, the harder it is to relieve. Effective pain management can break the pain cycle.   When you take care of your pain before it becomes a problem you will:     Heal faster     Be more comfortable when walking and doing breathing exercises     Regain your strength faster     Other Ways to Manage Pain   There are many ways besides medication to treat your pain. Ask your nurse or doctor for more information about:     Relaxation techniques     Guided imagery     Breathing exercises     Hot or cold packs     Massage     Changing position (elevation or support)     Using pillows or splints to protect incisions when coughing, laughing, etc.     Music     The goal is for you to be able to complete activities such as turning in bed, walking and doing deep breathing exercises with only mild to moderate pain.   Possible Side Effects of Pain Medications     Constipation     Sleepiness     Dry mouth     Nausea and/or vomiting   It is important for you to let your nurse or doctor know if you have any of these side effects.     What You Can Do to Help with the Side Effects     Drink as much fluid as possible     Eat foods high in fiber (beans, lentils, fruits)     Ask for medication if you continue to have problems with constipation     Suck on sugarless hard candy, or ice     Take pain medications with food     Peppermint can be helpful to decrease nausea     Managing Your Pain at Home   Your doctor may give you a prescription for pain medicine to take at home. Most pain medications to be taken  at home are in pill form.   Your nurse will review the instructions for taking your pain medications. When taken by mouth, medication can take up to 30 minutes to be effective. Remember to take pain medication when your pain first begins      Remember, same day surgery does not mean same day recovery.  Healing is a gradual process.  It is normal to be impatient and feel discouraged while waiting for swelling, bruising, discomfort and numbness to diminish.  Allow yourself to be a patient!  Extra rest, a nutritious diet, and avoidance of stress are important aids to recovery.

## 2019-11-26 NOTE — ANESTHESIA PREPROCEDURE EVALUATION
Anesthesia Pre-Procedure Evaluation    Patient: Sanjiv Shelton   MRN:     [id number] Gender:   male   Age:    34 year old :      1985        Preoperative Diagnosis: Chronic tonsillitis [J35.01]  Deviated nasal septum [J34.2]   Procedure(s):  ENDOSCOPIC SEPTOPLASTY, NOSE, WITH TURBINOPLASTY  Bilateral Tonsillectomy     History reviewed. No pertinent past medical history.   History reviewed. No pertinent surgical history.       Anesthesia Evaluation     . Pt has not had prior anesthetic     No history of anesthetic complications          ROS/MED HX    ENT/Pulmonary: Comment: Chronic tonsillitis  Deviated septum     (-) tobacco use   Neurologic:  - neg neurologic ROS     Cardiovascular:  - neg cardiovascular ROS   (+) ----. : . . . :. . No previous cardiac testing       METS/Exercise Tolerance:  >4 METS   Hematologic:  - neg hematologic  ROS       Musculoskeletal:  - neg musculoskeletal ROS       GI/Hepatic:  - neg GI/hepatic ROS       Renal/Genitourinary:  - ROS Renal section negative       Endo:  - neg endo ROS       Psychiatric:  - neg psychiatric ROS       Infectious Disease:  - neg infectious disease ROS       Malignancy:      - no malignancy   Other:    - neg other ROS                     PHYSICAL EXAM:   Mental Status/Neuro: A/A/O   Airway: Facies: Feasible  Mallampati: I  Mouth/Opening: Full  TM distance: > 6 cm  Neck ROM: Full   Respiratory: Auscultation: CTAB     Resp. Rate: Normal     Resp. Effort: Normal      CV: Rhythm: Regular  Rate: Age appropriate  Heart: Normal Sounds  Edema: None   Comments:      Dental: Normal Dentition                LABS:  CBC: No results found for: WBC, HGB, HCT, PLT  BMP:   Lab Results   Component Value Date     2012    POTASSIUM 4.2 2012    CHLORIDE 102 2012    CO2 25 2012    BUN 16 2012    BUN NOT APPLICABLE 2012    CR 1.11 2012    GLC 76 2012     COAGS: No results found for: PTT, INR, FIBR  POC: No  results found for: BGM, HCG, HCGS  OTHER:   Lab Results   Component Value Date    OCTAVIO 9.7 01/17/2012    ALBUMIN 5.1 01/17/2012    PROTTOTAL 7.7 01/17/2012    ALT 14 01/17/2012    AST 19 01/17/2012    ALKPHOS 55 01/17/2012    BILITOTAL 0.6 01/17/2012        Preop Vitals    BP Readings from Last 3 Encounters:   11/26/19 119/79   09/23/19 (!) 153/85   01/17/12 110/70    Pulse Readings from Last 3 Encounters:   09/23/19 70   09/13/08 60   05/13/06 68      Resp Readings from Last 3 Encounters:   11/26/19 16   09/23/19 16   05/13/06 12    SpO2 Readings from Last 3 Encounters:   11/26/19 97%   09/23/19 96%   09/13/08 98%      Temp Readings from Last 1 Encounters:   11/26/19 98.5  F (36.9  C) (Tympanic)    Ht Readings from Last 1 Encounters:   11/25/19 1.829 m (6')      Wt Readings from Last 1 Encounters:   11/25/19 102.1 kg (225 lb 1.4 oz)    Estimated body mass index is 30.53 kg/m  as calculated from the following:    Height as of this encounter: 1.829 m (6').    Weight as of this encounter: 102.1 kg (225 lb 1.4 oz).     LDA:  Peripheral IV 11/26/19 Right Hand (Active)   Site Assessment WDL 11/26/2019 12:37 PM   Line Status Saline locked 11/26/2019 12:37 PM   Phlebitis Scale 0-->no symptoms 11/26/2019 12:37 PM   Infiltration Scale 0 11/26/2019 12:37 PM   Infiltration Site Treatment Method  None 11/26/2019 12:37 PM   Number of days: 0        Assessment:   ASA SCORE: 1    H&P: History and physical reviewed and following examination; no interval change.   Smoking Status:  Non-Smoker/Unknown   NPO Status: NPO Appropriate     Plan:   Anes. Type:  General   Pre-Medication: Acetaminophen; Gabapentin   Induction:  IV (Standard)   Airway: ETT; Oral   Access/Monitoring: PIV   Maintenance: Balanced     Postop Plan:   Postop Pain: Opioids  Postop Sedation/Airway: Not planned  Disposition: Outpatient     PONV Management:   Adult Risk Factors:, Non-Smoker, Postop Opioids   Prevention: Ondansetron, Dexamethasone     CONSENT: Direct  conversation   Plan and risks discussed with: Patient   Blood Products: Consent Deferred (Minimal Blood Loss)                   Spencer Lozoya DO

## 2019-11-27 ENCOUNTER — NURSE TRIAGE (OUTPATIENT)
Dept: NURSING | Facility: CLINIC | Age: 34
End: 2019-11-27

## 2019-11-27 ENCOUNTER — TELEPHONE (OUTPATIENT)
Dept: OTOLARYNGOLOGY | Facility: CLINIC | Age: 34
End: 2019-11-27

## 2019-11-27 NOTE — TELEPHONE ENCOUNTER
Patient reports tonsillectomy and septoplasty today. Ate apple sauce and took Oxycodone and within 3 minutes vomited. Is nauseated. Feels weak/chills. Hasn't taken temperature. Says his nasal packing is bleeding. Changing about every hour. Changed it more frequently than an hour recently. He is wondering how much bleeding is too much. Says he feels two blood clots in his nose, wondering if he should blow his nose to dislodge the clots.     Surgery at [facility]. Surgeon was Dr Renae.     FNA paged on call provider for [facility], Dr Batool DOZIER via answering service at 9:50 pm to call patient back directly at [phone].    FNA advised patient to phone back FNA in 20 minutes if no response from on call MD and patient agreed.    Addie Salinas, RN/[facility]    Reason for Disposition    [1] Caller has URGENT question AND [2] triager unable to answer question    Additional Information    Negative: Sounds like a life-threatening emergency to the triager    Protocols used: POST-OP SYMPTOMS AND [id number]

## 2019-11-27 NOTE — TELEPHONE ENCOUNTER
Contacted Pt, reassuring Mr. Shelton that Dr Rowell does not think he could have moved anything or caused any damage.  Pt had no questions or concerns, agrees and understands.    Modesta Gastelum CMA  11/27/2019  12:38 PM

## 2019-11-27 NOTE — TELEPHONE ENCOUNTER
Reason for call:  Symptom   Symptom or request:  Patient had sinus surgery yesterday. After, he vomited twice. He wants to know if he could have moved anything? Please call to advise.     Duration (how long have symptoms been present):  Since yesterday.   Have you been treated for this before? No    Additional comments:  Na     Phone number to reach patient:  Home number on file [phone] (home)    Best Time:   Any     Can we leave a detailed message on this number?  YES

## 2019-11-28 NOTE — TELEPHONE ENCOUNTER
Sanjiv calls FNA to ask if he can use neti-pot/nasal rinses. He is Day 1 post septoplasty and tonsillectomy. He does not report any other concerns.     FNA paged on call provider Dr. Renae via smart web at 8:18 pm. Per Dr. Renae, patient can use use Netipot no more than twice a day, and to wait between 12 hours before each use. Do not use no more than lukewarm water for rinses.    FNA called patient back to relay recommendations and he verbalized understanding.     In case patient calls back for any non-urgent concerns, he can be instructed to call clinic during clinic hours.    Madonna Hernandez RN/[facility]        Reason for Disposition    [1] Caller has NON-URGENT question AND [2] triager unable to answer question    Additional Information    Negative: Sounds like a life-threatening emergency to the triager    Negative: Patient sounds very sick or weak to the triager    Negative: Sounds like a serious complication to the triager    Negative: [1] Caller has URGENT question AND [2] triager unable to answer question    Negative: [1] Discharged from hospital within this past week AND [2] taking Coumadin (warfarin) AND [3] no INR testing performed within 5 days of discharge    Protocols used: POST-HOSPITALIZATION FOLLOW-UP CALL-A-

## 2019-12-02 ENCOUNTER — OFFICE VISIT (OUTPATIENT)
Dept: OTOLARYNGOLOGY | Facility: CLINIC | Age: 34
End: 2019-12-02
Payer: COMMERCIAL

## 2019-12-02 VITALS
SYSTOLIC BLOOD PRESSURE: 132 MMHG | DIASTOLIC BLOOD PRESSURE: 80 MMHG | BODY MASS INDEX: 30.52 KG/M2 | HEART RATE: 78 BPM | OXYGEN SATURATION: 95 % | WEIGHT: 225 LBS

## 2019-12-02 DIAGNOSIS — J35.9 CHRONIC TONSIL AND ADENOID DISEASE: ICD-10-CM

## 2019-12-02 DIAGNOSIS — J34.2 DEVIATED NASAL SEPTUM: Primary | ICD-10-CM

## 2019-12-02 LAB — COPATH REPORT: NORMAL

## 2019-12-02 PROCEDURE — 99024 POSTOP FOLLOW-UP VISIT: CPT | Performed by: OTOLARYNGOLOGY

## 2019-12-02 ASSESSMENT — PAIN SCALES - GENERAL: PAINLEVEL: MILD PAIN (3)

## 2019-12-02 NOTE — PROGRESS NOTES
HPI - Sanjiv Shelton is a 34 year old male who is here for their first postoperative visit, status post endoscopic sinus surgery, septoplasty, and tonsillectomy on 11/26/2019.  They report the expected amount of congestion, facial pressure, and mild bloody drainage.  No changes in vision, no fevers or chills.  Nasal saline rinses and oral antibiotics have been tolerated.    Physical Exam  /80 (BP Location: Left arm, Patient Position: Sitting, Cuff Size: Adult Large)   Pulse 78   Wt 102.1 kg (225 lb)   SpO2 95%   BMI 30.52 kg/m      General - The patient is well nourished and well developed, and appears to have good nutritional status.  Alert and oriented to person and place, answers questions and cooperates with examination appropriately.   Head and Face - Normocephalic and atraumatic, with no gross asymmetry noted of the contour of the facial features.  The facial nerve is intact, with strong symmetric movements.  Eyes - Extraocular movements intact, and the pupils were reactive to light.  Sclera were not icteric or injected, conjunctiva were pink and moist.  Mouth - Examination of the oral cavity shows pink, healthy, moist mucosa.  No lesions or ulceration noted.  The dentition are in good repair.  The tongue is mobile and midline.  Nose - Nasal exam performed with a headlight and nasal speculum. I removed the Aaron splints with ease. I suctioned out a small amount of residual nasopore and dark mucous.  The middle meatus was visible and open bilaterally, no purulence or signs of early synechiae formation.    A/P - Sanjiv Shelton is a 34 year old male is doing well from sinus surgery.  There are no signs of complications or infection.  Patient instructed to continue saline rinses.  I will see them in 1 month for endoscopically assisted debridement of the sinuses.

## 2019-12-02 NOTE — PATIENT INSTRUCTIONS
Scheduling Information  To schedule your CT/MRI scan, please contact [facility] at [phone] OR [facility] at [phone]    To schedule your Surgery, please contact our Specialty Schedulers at [phone]      ENT Clinic Locations Clinic Hours Telephone Number     [facility]  [address] [place] [address]   Monday:           1:00pm -- 5:00pm    Friday:              8:00am - 12:00pm   To schedule/reschedule an appointment with   Dr. Renae,   please contact our   Specialty Scheduling Department at:     [phone]       [facility]  [address] [place] [address] Tuesday:          8:00am -- 2:00pm         Urgent Care Locations Clinic Hours Telephone Numbers     [facility]  [address] [place] [address]     Monday-Friday:     11:00am - 9:00pm    Saturday-Sunday:  9:00am - 5:00pm   [phone]     [facility]  [address] [place] [address]     Monday-Friday:      5:00pm - 9:00pm     Saturday-Sunday:  9:00am - 5:00pm   [phone]

## 2019-12-02 NOTE — LETTER
12/2/2019         RE: Sanjiv Shelton  [address] [facility] [address]        Dear Colleague,    Thank you for referring your patient, Sanjiv Shelton, to the [facility]. Please see a copy of my visit note below.    HPI - Sanjiv Shelton is a 34 year old male who is here for their first postoperative visit, status post endoscopic sinus surgery, septoplasty, and tonsillectomy on 11/26/2019.  They report the expected amount of congestion, facial pressure, and mild bloody drainage.  No changes in vision, no fevers or chills.  Nasal saline rinses and oral antibiotics have been tolerated.    Physical Exam  /80 (BP Location: Left arm, Patient Position: Sitting, Cuff Size: Adult Large)   Pulse 78   Wt 102.1 kg (225 lb)   SpO2 95%   BMI 30.52 kg/m       General - The patient is well nourished and well developed, and appears to have good nutritional status.  Alert and oriented to person and place, answers questions and cooperates with examination appropriately.   Head and Face - Normocephalic and atraumatic, with no gross asymmetry noted of the contour of the facial features.  The facial nerve is intact, with strong symmetric movements.  Eyes - Extraocular movements intact, and the pupils were reactive to light.  Sclera were not icteric or injected, conjunctiva were pink and moist.  Mouth - Examination of the oral cavity shows pink, healthy, moist mucosa.  No lesions or ulceration noted.  The dentition are in good repair.  The tongue is mobile and midline.  Nose - Nasal exam performed with a headlight and nasal speculum. I removed the Aaron splints with ease. I suctioned out a small amount of residual nasopore and dark mucous.  The middle meatus was visible and open bilaterally, no purulence or signs of early synechiae formation.    A/P - Sanjiv Shelton is a 34 year old male is doing well from sinus surgery.  There are no signs of  complications or infection.  Patient instructed to continue saline rinses.  I will see them in 1 month for endoscopically assisted debridement of the sinuses.      Again, thank you for allowing me to participate in the care of your patient.        Sincerely,        Jam Renae MD

## 2019-12-04 ENCOUNTER — TELEPHONE (OUTPATIENT)
Dept: OTOLARYNGOLOGY | Facility: CLINIC | Age: 34
End: 2019-12-04

## 2019-12-04 ENCOUNTER — HOSPITAL ENCOUNTER (EMERGENCY)
Facility: CLINIC | Age: 34
Discharge: HOME | End: 2019-12-04
Attending: EMERGENCY MEDICINE | Admitting: EMERGENCY MEDICINE
Payer: COMMERCIAL

## 2019-12-04 VITALS
OXYGEN SATURATION: 99 % | HEART RATE: 78 BPM | RESPIRATION RATE: 18 BRPM | TEMPERATURE: 97.4 F | BODY MASS INDEX: 29.84 KG/M2 | DIASTOLIC BLOOD PRESSURE: 81 MMHG | WEIGHT: 220 LBS | SYSTOLIC BLOOD PRESSURE: 120 MMHG

## 2019-12-04 DIAGNOSIS — R04.0 EPISTAXIS: ICD-10-CM

## 2019-12-04 DIAGNOSIS — Z98.890 S/P NASAL SEPTOPLASTY: ICD-10-CM

## 2019-12-04 LAB
APTT PPP: 32 SEC (ref 22–37)
HGB BLD-MCNC: 15.5 G/DL (ref 13.3–17.7)
INR PPP: 1.06 (ref 0.86–1.14)

## 2019-12-04 PROCEDURE — 85730 THROMBOPLASTIN TIME PARTIAL: CPT | Performed by: EMERGENCY MEDICINE

## 2019-12-04 PROCEDURE — 85610 PROTHROMBIN TIME: CPT | Performed by: EMERGENCY MEDICINE

## 2019-12-04 PROCEDURE — 30903 CONTROL OF NOSEBLEED: CPT | Mod: LT

## 2019-12-04 PROCEDURE — 85018 HEMOGLOBIN: CPT | Performed by: EMERGENCY MEDICINE

## 2019-12-04 PROCEDURE — 25000125 ZZHC RX 250

## 2019-12-04 PROCEDURE — 99284 EMERGENCY DEPT VISIT MOD MDM: CPT

## 2019-12-04 RX ORDER — TRANEXAMIC ACID 100 MG/ML
INJECTION, SOLUTION INTRAVENOUS
Status: COMPLETED
Start: 2019-12-04 | End: 2019-12-04

## 2019-12-04 RX ADMIN — TRANEXAMIC ACID 1000 MG: 100 INJECTION, SOLUTION INTRAVENOUS at 17:36

## 2019-12-04 ASSESSMENT — ENCOUNTER SYMPTOMS: NAUSEA: 0

## 2019-12-04 NOTE — ED AVS SNAPSHOT
Emergency Department  [address] [place] [address]  Phone:  [phone]  Fax:  [phone]                                    Sanjiv Shelton   MRN: [id number]    Department:   Emergency Department   Date of Visit:  12/4/2019           After Visit Summary Signature Page    I have received my discharge instructions, and my questions have been answered. I have discussed any challenges I see with this plan with the nurse or doctor.    ..........................................................................................................................................  Patient/Patient Representative Signature      ..........................................................................................................................................  Patient Representative Print Name and Relationship to Patient    ..................................................               ................................................  Date                                   Time    ..........................................................................................................................................  Reviewed by Signature/Title    ...................................................              ..............................................  Date                                               Time          22EPIC Rev 08/18

## 2019-12-04 NOTE — ED TRIAGE NOTES
Pt had septoplasty and tonsilectomy last week, things were going great. Started with epistaxis today, called clinic and no ENT MD was in, told to come to ED.

## 2019-12-04 NOTE — TELEPHONE ENCOUNTER
Was contacted directly by [facility] ER, and discussed with ER physician. Knowing the anatomy from the surgery, the only possible site of bleeding would be the site where the spur was removed and small tear in the mucosa on the LEFT side of the septum, about 3-4cm back.  I advised bilateral rhino rockets with moderate inflation.  If not effective call me again.  Unfortunately, I do not have privileges at [facility], in the event that emergency surgery is needed, on call ENT would need to be activated.

## 2019-12-04 NOTE — TELEPHONE ENCOUNTER
Patient is status post endoscopic sinus surgery, septoplasty, and tonsillectomy on 11/26/2019 by Dr. Renae. Patient and wife report patient was sitting down 5 minutes ago and his nose started gushing blood, posteriorly and anteriorly. He is pinching his nose now with kleenex and tilting his head forward. It has slowed down a little but is still bleeding. RN advised patient to go to nearest ER, preferably  where he had surgery done. Patient verbalized understanding.    Maurice Gonzales RN....12/4/2019 2:37 PM

## 2019-12-04 NOTE — TELEPHONE ENCOUNTER
Pt had tonsils removed after the surgery everything has been going well. But today the patient had an uncontrollable nosebleed.

## 2019-12-04 NOTE — ED PROVIDER NOTES
History     Chief Complaint:  Post-Op Problem    The history is provided by the patient and the spouse.      Sanjiv Shelton is a 34 year old male who presents with a post operative problem. He had a septoplasty and tonsillectomy 7 days ago by Dr. Renae at [facility]. He returned to work today and approximately 1 hour PTA, while talking on the phone, he started to bleed from his right nare. He called Dr. Renae's office and was directed to the ED. He denies nausea or any other concerns. He has been blowing his nose and using Neti pot since surgery. He has cfollowed up as scheduled and there were no issues until today.     Allergies:  Sulfa drugs      Medications:    Zofran     Past Medical History:    Chronic tonsillitis   Deviated nasal septum     Past Surgical History:    Septoplasty, turbinoplasty  Tonsillectomy     Family History:    No past pertinent family history.    Social History:  Negative for tobacco use.  3 drinks/week   Negative for drug use.  Marital Status:  Single   Presents with significant other.    Review of Systems   Constitutional: Negative for fever.   HENT: Positive for nosebleeds.    Respiratory: Negative for shortness of breath.    Gastrointestinal: Negative for nausea and vomiting.   Musculoskeletal: Negative for gait problem.   Skin: Negative for wound.   All other systems reviewed and are negative.    Physical Exam     Patient Vitals for the past 24 hrs:   BP Temp Temp src Pulse Heart Rate Resp SpO2 Weight   12/04/19 1743 120/81 -- -- 78 -- -- -- --   12/04/19 1457 (!) 142/84 97.4  F (36.3  C) Temporal 64 64 18 99 % 99.8 kg (220 lb)       Physical Exam  General: Well-developed and well-nourished. Well appearing young  man. Cooperative.  Head:  Atraumatic.  Eyes:  Conjunctivae, lids, and sclerae are normal.  ENT:    Brisk epistaxis from the right nare.  Normal postoperative changes in the posterior oropharynx status post tonsillectomy without bleeding from the  surgical sites.  Neck:  Supple. Normal range of motion.  Resp:  No respiratory distress.   GI:  Non-distended.    MS:  Normal ROM.   Skin:  Warm. Non-diaphoretic. No pallor.  Neuro: Awake. A&Ox3. Normal strength.  Psych:  Normal mood and affect. Normal speech.  Vitals reviewed.    Emergency Department Course     Laboratory:  Hemoglobin: 15.5   INR: 1.06   PTT: 32       Rapid Rhino Nasal Packing     PHYSICIAN: Etta Dominguez MD    INDICATION:  Epistaxis    ANESTHESIA: None    TECHNIQUE: 5.5 cm Rapid Rhino was inserted into both nares to provide pressure. The patient had adequate hemostasis after application of packing, and the patient was generally comfortable after the procedure. The patient tolerated the procedure well with no immediate complications.     Emergency Department Course:  Nursing notes and vitals reviewed. (1518) I performed an exam of the patient as documented above.     IV inserted. Medicine administered as documented above. Blood drawn. This was sent to the lab for further testing, results above.    I gently placed a TXA-soaked cotton ball in the right nare.    (1545) I rechecked the patient and packed his nare with 8 cm Merocel.    (1610) I consulted with Dr. Renae, ENT, ([phone]) regarding the patient's history and presentation here in the emergency department.    (1616) I rechecked the patient and packed his nose as noted above in the procedure note.     (8064) I reevaluated the patient and provided an update in regards to his ED course.  The patient reports no more bleeding from the nare or any more blood going down his throat.     Findings and plan explained to the patient. Patient discharged home with instructions regarding supportive care, medications, and reasons to return. The importance of close follow-up was reviewed. The patient was prescribed Augmentin.     I personally reviewed the laboratory results with the patient and answered all related questions prior to discharge.      Impression & Plan    Medical Decision Making:  Sanjiv is a 34-year-old man who had a septoplasty and tonsillectomy 8 days ago and had been doing quite well when he had sudden onset of epistaxis primarily from the right nare which he was unable to stop at home.  He denies all other concerns or complaints and appears well on exam aside from brisk epistaxis from this right nare.  If pressure is applied to stop the bleeding it then goes posteriorly to the oropharynx.  I initially placed simply a TXA soaked cottonball in the nose as I was concerned about his recent septoplasty and nasal packing.  This did not result in any improvement in symptoms so a Merocel packing was then placed.  This did result in some slowing in the rapidity of epistaxis, without complete resolution.  I was then able to talk with the patient's surgeon, Dr. Renae, who recommends placement of bilateral 5.5 cm rapid Rhinos for cessation of bleeding with plan to remove these in 5 days with prophylactic Augmentin in the interim.  I placed the rapid Rhinos as above which patient tolerated very well.  Fortunately, this did resulted in appropriate cessation of his bleeding.  Hemoglobin is stable at 15.5 without obvious coagulopathy on laboratory studies and, because this patient has appropriate follow-up with his surgeon without epistaxis after placement of bilateral rapid Rhinos, he is appropriate for outpatient treatment on his surgeon's recommendation.  He agrees to take Augmentin as instructed and we also discussed return precautions including, but not limited to, fever or recurrence of bleeding.  All questions answered.  Amenable to discharge.    Diagnosis:    ICD-10-CM    1. Epistaxis R04.0    2. S/P nasal septoplasty Z98.890        Disposition:  discharged home    Discharge Medications:  Discharge Medication List as of 12/4/2019  5:33 PM      START taking these medications    Details   amoxicillin-clavulanate (AUGMENTIN) 875-125 MG tablet  Take 1 tablet by mouth 2 times daily for 10 days, Disp-20 tablet, R-0, Local Print           Scribe Disclosure:  I, Sissy Edmond, am serving as a scribe on 12/4/2019 at 3:18 PM to personally document services performed by Etta Dominguez MD based on my observations and the provider's statements to me.       Sissy Edmond  12/4/2019    EMERGENCY DEPARTMENT       Etta Dominguez MD  12/06/19 7850

## 2019-12-04 NOTE — DISCHARGE INSTRUCTIONS
Take antibiotics as instructed.  Return immediately if you have recurrence of bleeding, fever, or other concerns or visit to the nearest ER.  It may be beneficial to go to [facility] as Dr. Renae has privileges there.

## 2019-12-05 ENCOUNTER — TELEPHONE (OUTPATIENT)
Dept: OTOLARYNGOLOGY | Facility: CLINIC | Age: 34
End: 2019-12-05

## 2019-12-05 NOTE — TELEPHONE ENCOUNTER
Scheduled pt an apt for 12/9/19 at 1 PM to have the packing removed per Dr. Renae.    Maurice Gonzales RN....12/5/2019 1:51 PM

## 2019-12-05 NOTE — TELEPHONE ENCOUNTER
Reason for Call:  Other appointment    Detailed comments: patient needs an appointment to have Dr. Renae remove the packing from the ER doctor.    Also, patient has discharge from nose, but not bleeding, just slightly.    Thank you.    Phone Number Patient can be reached at: Cell number on file:    Telephone Information:   Mobile [phone]       Best Time: asap    Can we leave a detailed message on this number? YES    Call taken on 12/5/2019 at 12:09 PM by Joyce Mcclain

## 2019-12-06 ASSESSMENT — ENCOUNTER SYMPTOMS
VOMITING: 0
FEVER: 0
SHORTNESS OF BREATH: 0
WOUND: 0

## 2019-12-09 ENCOUNTER — OFFICE VISIT (OUTPATIENT)
Dept: OTOLARYNGOLOGY | Facility: CLINIC | Age: 34
End: 2019-12-09
Payer: COMMERCIAL

## 2019-12-09 VITALS
WEIGHT: 220 LBS | BODY MASS INDEX: 29.8 KG/M2 | HEIGHT: 72 IN | RESPIRATION RATE: 12 BRPM | SYSTOLIC BLOOD PRESSURE: 142 MMHG | HEART RATE: 63 BPM | DIASTOLIC BLOOD PRESSURE: 73 MMHG | OXYGEN SATURATION: 100 %

## 2019-12-09 DIAGNOSIS — J34.2 DEVIATED NASAL SEPTUM: Primary | ICD-10-CM

## 2019-12-09 PROCEDURE — 99024 POSTOP FOLLOW-UP VISIT: CPT | Performed by: OTOLARYNGOLOGY

## 2019-12-09 ASSESSMENT — MIFFLIN-ST. JEOR: SCORE: 1980.91

## 2019-12-09 NOTE — PROGRESS NOTES
HPI - Sanjiv Shelton is a 34 year old male who is here for added on follow up, status post endoscopic sinus surgery, septoplasty, and tonsillectomy on 11/26/2019.  The patient looked perfect att the follow up on 12/2, but on 12/4 he acutely and spontaneously started bleeding from the LEFT side of the nose and it would not stop.  Therefore he went to [facility] and was packed bilaterally.  He is here for packing removal.    Physical Exam  There were no vitals taken for this visit.    General - The patient is well nourished and well developed, and appears to have good nutritional status.  Alert and oriented to person and place, answers questions and cooperates with examination appropriately.   Head and Face - Normocephalic and atraumatic, with no gross asymmetry noted of the contour of the facial features.  The facial nerve is intact, with strong symmetric movements.  Eyes - Extraocular movements intact, and the pupils were reactive to light.  Sclera were not icteric or injected, conjunctiva were pink and moist.  Mouth - Examination of the oral cavity shows pink, healthy, moist mucosa.  No lesions or ulceration noted.  The dentition are in good repair.  The tongue is mobile and midline.  Nose - Nasal exam performed with a headlight and nasal speculum. I removed the bilateral rhino rockets with ease. I suctioned out a small amount of residual nasopore and clots and dark mucous.  The middle meatus was visible and open bilaterally, no purulence or signs of early synechiae formation.  No residual bleeding at all    A/P - Sanjiv Shelton is a 34 year old male is doing well from sinus surgery and septoplasty despite the very unsual late bleed.  I have asked him to refrain from nose blowing for another 48 hours, then resume normal activity.    Follow up as we had planned in 1 month.

## 2019-12-09 NOTE — LETTER
12/9/2019         RE: Sanjiv Shelton  [address] [facility] [address]        Dear Colleague,    Thank you for referring your patient, Sanjiv Shelton, to the [facility]. Please see a copy of my visit note below.    HPI - Sanjiv Shelton is a 34 year old male who is here for added on follow up, status post endoscopic sinus surgery, septoplasty, and tonsillectomy on 11/26/2019.  The patient looked perfect att the follow up on 12/2, but on 12/4 he acutely and spontaneously started bleeding from the LEFT side of the nose and it would not stop.  Therefore he went to [facility] and was packed bilaterally.  He is here for packing removal.    Physical Exam  There were no vitals taken for this visit.    General - The patient is well nourished and well developed, and appears to have good nutritional status.  Alert and oriented to person and place, answers questions and cooperates with examination appropriately.   Head and Face - Normocephalic and atraumatic, with no gross asymmetry noted of the contour of the facial features.  The facial nerve is intact, with strong symmetric movements.  Eyes - Extraocular movements intact, and the pupils were reactive to light.  Sclera were not icteric or injected, conjunctiva were pink and moist.  Mouth - Examination of the oral cavity shows pink, healthy, moist mucosa.  No lesions or ulceration noted.  The dentition are in good repair.  The tongue is mobile and midline.  Nose - Nasal exam performed with a headlight and nasal speculum. I removed the bilateral rhino rockets with ease. I suctioned out a small amount of residual nasopore and clots and dark mucous.  The middle meatus was visible and open bilaterally, no purulence or signs of early synechiae formation.  No residual bleeding at all    A/P - Sanjiv Shelton is a 34 year old male is doing well from sinus surgery and septoplasty despite the very unsual late  bleed.  I have asked him to refrain from nose blowing for another 48 hours, then resume normal activity.    Follow up as we had planned in 1 month.      Again, thank you for allowing me to participate in the care of your patient.        Sincerely,        Jam Renae MD

## 2019-12-09 NOTE — PATIENT INSTRUCTIONS
Scheduling Information  To schedule your CT/MRI scan, please contact [facility] at [phone] OR [facility] at [phone]    To schedule your Surgery, please contact our Specialty Schedulers at [phone]      ENT Clinic Locations Clinic Hours Telephone Number     [facility]  [address] [facility] [place] [address]   Monday:           1:00pm -- 5:00pm    Friday:              8:00am - 12:00pm   To schedule/reschedule an appointment with   Dr. Renae,   please contact our   Specialty Scheduling Department at:     [phone]       [facility]  [address] [place]  [place] [address] Tuesday:          8:00am -- 2:00pm         Urgent Care Locations Clinic Hours Telephone Numbers     [facility]  [address] [place] [address]     Monday-Friday:     11:00am - 9:00pm    Saturday-Sunday:  9:00am - 5:00pm   [phone]     [facility]  [address] [place] [address]     Monday-Friday:      5:00pm - 9:00pm     Saturday-Sunday:  9:00am - 5:00pm   [phone]

## 2019-12-26 ENCOUNTER — OFFICE VISIT (OUTPATIENT)
Dept: OTOLARYNGOLOGY | Facility: CLINIC | Age: 34
End: 2019-12-26
Payer: COMMERCIAL

## 2019-12-26 VITALS
HEART RATE: 74 BPM | WEIGHT: 220 LBS | RESPIRATION RATE: 16 BRPM | HEIGHT: 72 IN | SYSTOLIC BLOOD PRESSURE: 141 MMHG | DIASTOLIC BLOOD PRESSURE: 87 MMHG | BODY MASS INDEX: 29.8 KG/M2 | OXYGEN SATURATION: 97 %

## 2019-12-26 DIAGNOSIS — J32.4 CHRONIC PANSINUSITIS: Primary | ICD-10-CM

## 2019-12-26 PROCEDURE — 31231 NASAL ENDOSCOPY DX: CPT | Mod: 58 | Performed by: OTOLARYNGOLOGY

## 2019-12-26 PROCEDURE — 99024 POSTOP FOLLOW-UP VISIT: CPT | Performed by: OTOLARYNGOLOGY

## 2019-12-26 ASSESSMENT — MIFFLIN-ST. JEOR: SCORE: 1980.91

## 2019-12-26 NOTE — PROGRESS NOTES
[facility] - Sanjiv Shelton is a 34 year old male who is here for added on follow up, status post endoscopic sinus surgery, septoplasty, and tonsillectomy on 11/26/2019.      He had an atypical post op course. The patient looked perfect att the follow up on 12/2, but on 12/4 he acutely and spontaneously started bleeding from the LEFT side of the nose and it would not stop.  Therefore he went to [facility] and was packed bilaterally.  When he saw me on 12/9/19 I removed the packs and thankfully everything looked perfect.  He is here for another post op check and endoscopic exam.    Physical Exam  BP (!) 141/87   Pulse 74   Resp 16   Ht 1.829 m (6')   Wt 99.8 kg (220 lb)   SpO2 97%   BMI 29.84 kg/m      General - The patient is well nourished and well developed, and appears to have good nutritional status.  Alert and oriented to person and place, answers questions and cooperates with examination appropriately.   Head and Face - Normocephalic and atraumatic, with no gross asymmetry noted of the contour of the facial features.  The facial nerve is intact, with strong symmetric movements.  Eyes - Extraocular movements intact, and the pupils were reactive to light.  Sclera were not icteric or injected, conjunctiva were pink and moist.  Mouth - Examination of the oral cavity shows pink, healthy, moist mucosa.  No lesions or ulceration noted.  The dentition are in good repair.  The tongue is mobile and midline.  The tonsillar fossa are well healed and re mucosalized.    To evaluate the nose in the postoperative state I performed rigid nasal endoscopy.  I first sprayed with lidocaine and neosynephrine.  I began with the LEFT side using a 2.7mm 30 degree rigid nasal endoscope, and color photographs were taken for the medical record.    The middle meatus was open, and I was able to pass the scope through.  The LEFT maxillary antrostomy is open, and looking down and into the LEFT maxillary sinus, the mucosa looks  healthy, no abnormal secretions.  Going further back, the ethmoid roof is nicely re-mucosalized, and there is no abnormal secretions or polypoid degeneration.    The right side was then examined.  The middle meatus was open and I visualized the RIGHT antrostomy was open.  Looking down into the RIGHT maxillary sinus, the mucosa was flat and healthy in appearance.  Going further back the ethmoid system looks good.  The mucosa is healthy, and there were polyps or polypoid degenerations.        A/P - Sanjiv GLO Nikita is a 34 year old male is doing well from sinus surgery and septoplasty despite the very unsual late bleed.     Follow up as needed at this point.

## 2019-12-26 NOTE — LETTER
12/26/2019         RE: Sanjiv Shelton  [address] [facility] [address]        Dear Colleague,    Thank you for referring your patient, Sanjiv Shelton, to the [facility]. Please see a copy of my visit note below.    HPI - Sanjiv Shelton is a 34 year old male who is here for added on follow up, status post endoscopic sinus surgery, septoplasty, and tonsillectomy on 11/26/2019.      He had an atypical post op course. The patient looked perfect att the follow up on 12/2, but on 12/4 he acutely and spontaneously started bleeding from the LEFT side of the nose and it would not stop.  Therefore he went to [facility] and was packed bilaterally.  When he saw me on 12/9/19 I removed the packs and thankfully everything looked perfect.  He is here for another post op check and endoscopic exam.    Physical Exam  BP (!) 141/87   Pulse 74   Resp 16   Ht 1.829 m (6')   Wt 99.8 kg (220 lb)   SpO2 97%   BMI 29.84 kg/m       General - The patient is well nourished and well developed, and appears to have good nutritional status.  Alert and oriented to person and place, answers questions and cooperates with examination appropriately.   Head and Face - Normocephalic and atraumatic, with no gross asymmetry noted of the contour of the facial features.  The facial nerve is intact, with strong symmetric movements.  Eyes - Extraocular movements intact, and the pupils were reactive to light.  Sclera were not icteric or injected, conjunctiva were pink and moist.  Mouth - Examination of the oral cavity shows pink, healthy, moist mucosa.  No lesions or ulceration noted.  The dentition are in good repair.  The tongue is mobile and midline.  The tonsillar fossa are well healed and re mucosalized.    To evaluate the nose in the postoperative state I performed rigid nasal endoscopy.  I first sprayed with lidocaine and neosynephrine.  I began with the LEFT side using a 2.7mm 30  degree rigid nasal endoscope, and color photographs were taken for the medical record.    The middle meatus was open, and I was able to pass the scope through.  The LEFT maxillary antrostomy is open, and looking down and into the LEFT maxillary sinus, the mucosa looks healthy, no abnormal secretions.  Going further back, the ethmoid roof is nicely re-mucosalized, and there is no abnormal secretions or polypoid degeneration.    The right side was then examined.  The middle meatus was open and I visualized the RIGHT antrostomy was open.  Looking down into the RIGHT maxillary sinus, the mucosa was flat and healthy in appearance.  Going further back the ethmoid system looks good.  The mucosa is healthy, and there were polyps or polypoid degenerations.        A/P - Sanjiv Shelton is a 34 year old male is doing well from sinus surgery and septoplasty despite the very unsual late bleed.     Follow up as needed at this point.    Again, thank you for allowing me to participate in the care of your patient.        Sincerely,        Jam Renae MD

## 2019-12-26 NOTE — PATIENT INSTRUCTIONS
Scheduling Information  To schedule your CT/MRI scan, please contact [facility] at [phone] OR [facility] at [phone]    To schedule your Surgery, please contact our Specialty Schedulers at [phone]      ENT Clinic Locations Clinic Hours Telephone Number     Leni Shabazz  [address] [place] [address]   Monday:           1:00pm -- 5:00pm    Friday:              8:00am - 12:00pm   To schedule/reschedule an appointment with   Dr. Renae,   please contact our   Specialty Scheduling Department at:     [phone]       Leni Brenner  [address] [place] [address] Tuesday:          8:00am -- 2:00pm         Urgent Care Locations Clinic Hours Telephone Numbers     Leni Brenner  [address] [place] [address]     Monday-Friday:     11:00am - 9:00pm    Saturday-Sunday:  9:00am - 5:00pm   [phone]     [facility]  [address] [place] [address]     Monday-Friday:      5:00pm - 9:00pm     Saturday-Sunday:  9:00am - 5:00pm   [phone]

## 2020-03-15 ENCOUNTER — HEALTH MAINTENANCE LETTER (OUTPATIENT)
Age: 35
End: 2020-03-15

## 2020-04-28 NOTE — TELEPHONE ENCOUNTER
Per email dated 03/05/20 from Bernice Guerrero:  [facility] Patient Relations received concerns from a patient regarding a prior authorization and a bill he received for the below DOS. Please see the summary below: This patient had concerns with not having a PA completed prior to his deviated septum and removal of tonsil procedure on that DOS. The patient was told this would be completed and was given the authorization number (#[id number]) and was informed by his [employer] insurance that it was not deemed medically necessary and they denied the claim. The patient stated that he received a bill from . The patient advised that he believes there is someone already working on this prior authorization retroactively.     I (Massiel) called [employer] of [place] and spoke to Nicole. She advised that CPT code 14114 does need a prior auth. I advised her that [employer] in October said one was not needed. She said to resubmit bill with the reference number for [employer] and clinicals from Dr. Renae to get this approved. Call reference number [id number].    Email back to supervisors asking for contact at business office to get this bill and other information resubmitted to [employer].

## 2020-06-01 ENCOUNTER — VIRTUAL VISIT (OUTPATIENT)
Dept: OTOLARYNGOLOGY | Facility: CLINIC | Age: 35
End: 2020-06-01
Payer: COMMERCIAL

## 2020-06-01 ENCOUNTER — TELEPHONE (OUTPATIENT)
Dept: OTOLARYNGOLOGY | Facility: CLINIC | Age: 35
End: 2020-06-01

## 2020-06-01 DIAGNOSIS — Z98.890 HISTORY OF NASAL SEPTOPLASTY: ICD-10-CM

## 2020-06-01 DIAGNOSIS — J34.89 NASAL OBSTRUCTION: ICD-10-CM

## 2020-06-01 DIAGNOSIS — R44.8 FACIAL PRESSURE: Primary | ICD-10-CM

## 2020-06-01 PROCEDURE — 99214 OFFICE O/P EST MOD 30 MIN: CPT | Mod: 95 | Performed by: OTOLARYNGOLOGY

## 2020-06-01 NOTE — PROGRESS NOTES
History of Present Illness - Sanjiv Shelton is a 34 year old male here for a telephone encounter.  He is status post  septoplasty, and tonsillectomy on 11/26/2019.      He had an atypical post op course. The patient looked perfect att the follow up on 12/2, but on 12/4 he acutely and spontaneously started bleeding from the LEFT side of the nose and it would not stop.  Therefore he went to [facility] and was packed bilaterally.  When he saw me on 12/9/19 I removed the packs and thankfully everything looked perfect.  He is here for another post op check and endoscopic exam.    However, at the most recent follow up visit on 12/26/2019, things looked perfect and his symptoms were all dramatically improved.    The reason he has set up a telephone visit is that he has continued issues with a sense of pressure, as well as drainage.  He gets large jellylike blobs of mucous from his nose post nasally.  Also the RIGHT nostril does not breath as well LEFT.      Past medical history -   Patient Active Problem List   Diagnosis     Chronic tonsillitis     Deviated nasal septum         A/P - Sanjiv Shelton  (R68.89) Facial pressure  (primary encounter diagnosis)  (J34.89) Nasal obstruction  (Z98.890) History of nasal septoplasty  (Z98.890) History of endoscopic sinus surgery    Based on his symptoms, I do think it is worthwhile to get a new CT to see if there are newly obstructed sinuses or signs of sinus disease.    I will call him with results, and see if I need to see him in person and look in the nose.    ATTESTATION: 25 minutes were spent reviewing the medical records, old operative reports, and in discussion and counseling of the patient.

## 2020-06-01 NOTE — PROGRESS NOTES
"Sanjiv Shelton is a 34 year old male who is being evaluated via a billable telephone visit.      The patient has been notified of following:     \"This telephone visit will be conducted via a call between you and your physician/provider. We have found that certain health care needs can be provided without the need for a physical exam.  This service lets us provide the care you need with a short phone conversation.  If a prescription is necessary we can send it directly to your pharmacy.  If lab work is needed we can place an order for that and you can then stop by our lab to have the test done at a later time.    Telephone visits are billed at different rates depending on your insurance coverage. During this emergency period, for some insurers they may be billed the same as an in-person visit.  Please reach out to your insurance provider with any questions.    If during the course of the call the physician/provider feels a telephone visit is not appropriate, you will not be charged for this service.\"    Patient has given verbal consent for Telephone visit?  Yes    What phone number would you like to be contacted at?   [phone]  How would you like to [facility] your [facility]? [facility]    Phone call duration: 15 minutes    Jam Renae MD    "

## 2020-06-01 NOTE — TELEPHONE ENCOUNTER
Reason for call:  Other     Patient called regarding (reason for call): CT     Additional comments: Patient calling stating that his CT scan order needs prior auth per his insurance. Please call to advise.       Phone number to reach patient:  Home number on file [phone] (home)    Best Time:  Any     Can we leave a detailed message on this number?  YES    Travel screening: Not Applicable

## 2020-06-01 NOTE — LETTER
"    6/1/2020         RE: Sanjiv Shelton  [address] [facility] [address]        Dear Colleague,    Thank you for referring your patient, Sanjiv Shelton, to the [facility]. Please see a copy of my visit note below.    Sanjiv Shelton is a 34 year old male who is being evaluated via a billable telephone visit.      The patient has been notified of following:     \"This telephone visit will be conducted via a call between you and your physician/provider. We have found that certain health care needs can be provided without the need for a physical exam.  This service lets us provide the care you need with a short phone conversation.  If a prescription is necessary we can send it directly to your pharmacy.  If lab work is needed we can place an order for that and you can then stop by our lab to have the test done at a later time.    Telephone visits are billed at different rates depending on your insurance coverage. During this emergency period, for some insurers they may be billed the same as an in-person visit.  Please reach out to your insurance provider with any questions.    If during the course of the call the physician/provider feels a telephone visit is not appropriate, you will not be charged for this service.\"    Patient has given verbal consent for Telephone visit?  Yes    What phone number would you like to be contacted at?   [phone]  How would you like to obtain your AVS? [facility]    Phone call duration: 15 minutes    Jam Renae MD      History of Present Illness - Sanjiv Shelton is a 34 year old male here for a telephone encounter.  He is status post  septoplasty, and tonsillectomy on 11/26/2019.      He had an atypical post op course. The patient looked perfect att the follow up on 12/2, but on 12/4 he acutely and spontaneously started bleeding from the LEFT side of the nose and it would not stop.  Therefore he went to [facility] and was packed bilaterally.  When he saw me on 12/9/19 I removed the packs and thankfully everything looked perfect.  He is here for another post op check and endoscopic exam.    However, at the most recent follow up visit on 12/26/2019, things looked perfect and his symptoms were all dramatically improved.    The reason he has set up a telephone visit is that he has continued issues with a sense of pressure, as well as drainage.  He gets large jellylike blobs of mucous from his nose post nasally.  Also the RIGHT nostril does not breath as well LEFT.      Past medical history -   Patient Active Problem List   Diagnosis     Chronic tonsillitis     Deviated nasal septum         A/P - Sanjiv Shelton  (R68.89) Facial pressure  (primary encounter diagnosis)  (J34.89) Nasal obstruction  (Z98.890) History of nasal septoplasty  (Z98.890) History of endoscopic sinus surgery    Based on his symptoms, I do think it is worthwhile to get a new CT to see if there are newly obstructed sinuses or signs of sinus disease.    I will call him with results, and see if I need to see him in person and look in the nose.    ATTESTATION: 25 minutes were spent reviewing the medical records, old operative reports, and in discussion and counseling of the patient.    Again, thank you for allowing me to participate in the care of your patient.        Sincerely,        Jam Renae MD

## 2020-06-02 ENCOUNTER — ANCILLARY PROCEDURE (OUTPATIENT)
Dept: CT IMAGING | Facility: CLINIC | Age: 35
End: 2020-06-02
Attending: OTOLARYNGOLOGY
Payer: COMMERCIAL

## 2020-06-02 ENCOUNTER — TELEPHONE (OUTPATIENT)
Dept: OTOLARYNGOLOGY | Facility: CLINIC | Age: 35
End: 2020-06-02

## 2020-06-02 DIAGNOSIS — Z98.890 HISTORY OF NASAL SEPTOPLASTY: ICD-10-CM

## 2020-06-02 DIAGNOSIS — R44.8 FACIAL PRESSURE: ICD-10-CM

## 2020-06-02 DIAGNOSIS — J34.89 NASAL OBSTRUCTION: ICD-10-CM

## 2020-06-02 PROCEDURE — 70486 CT MAXILLOFACIAL W/O DYE: CPT | Performed by: RADIOLOGY

## 2020-06-02 NOTE — LETTER
[address] [address]  Phone: [phone]      June 5, 2020    Sanjiv Shelton  [facility] [address] [facility] [address]          Dear To Whom It May Concern:    I am writing this letter of medical necessity for my patient, Sanjiv Shelton, with regards to the surgery I performed on him on 11/26/2019.  He had a tonsillectomy and nasal septoplasty.    The tonsillectomy was necessary due to a multi-year history of chronic tonsil infections, averaging 4 per year. Most of these infections resulted in missed work and need for oral antibiotics.  Removal of the tonsils successfully cured this chronic disease.    The septoplasty was performed due to chronic nasal obstruction.  He was found to have a severely deviated and obstructive nasal septum on clinical examination.  Surgical repair and straightening of the septum has subjectively improved his nasal airway, sleep quality, exercise tolerance, and quality of life.    Please contact my office with any further questions.      Sincerely,    Jam Renae MD  [facility]  Department of Otolaryngology

## 2020-06-02 NOTE — TELEPHONE ENCOUNTER
Patient needs a letter of medical necessity for DOS 11/26/2019. Please send to [facility], business office so this can be re billed to insurance. Please send in PDF form to:  [email or web address] and cc  [email or web address]    Thank you,   Massiel Smith   Referral Department  [phone]

## 2020-06-02 NOTE — TELEPHONE ENCOUNTER
Patient is checking status of previous message. Patient is scheduled for CT scan at 4:00 pm today. Please call soon.

## 2020-06-03 DIAGNOSIS — J31.0 CHRONIC RHINITIS: Primary | ICD-10-CM

## 2020-06-03 DIAGNOSIS — J01.40 ACUTE NON-RECURRENT PANSINUSITIS: ICD-10-CM

## 2020-06-03 RX ORDER — BUDESONIDE 0.5 MG/2ML
INHALANT ORAL
Qty: 30 AMPULE | Refills: 6 | Status: SHIPPED | OUTPATIENT
Start: 2020-06-03

## 2020-06-03 NOTE — PROGRESS NOTES
Called and spoke with patient and discussed CT sinus.  Not a severe case of pansinusitis, but there is definitely narrowing of the ethmoid and frontoethmoid recesses bilaterally.  Will try non-surgical methods first, starting with budesonide ampules mixed into Saline irrigations.    Call if not working after a month

## 2020-10-08 ENCOUNTER — OFFICE VISIT (OUTPATIENT)
Dept: OTOLARYNGOLOGY | Facility: CLINIC | Age: 35
End: 2020-10-08
Payer: COMMERCIAL

## 2020-10-08 VITALS
HEIGHT: 72 IN | WEIGHT: 220 LBS | RESPIRATION RATE: 19 BRPM | DIASTOLIC BLOOD PRESSURE: 70 MMHG | HEART RATE: 59 BPM | BODY MASS INDEX: 29.8 KG/M2 | OXYGEN SATURATION: 98 % | SYSTOLIC BLOOD PRESSURE: 123 MMHG

## 2020-10-08 DIAGNOSIS — J34.2 DEVIATED NASAL SEPTUM: Primary | ICD-10-CM

## 2020-10-08 DIAGNOSIS — J31.0 CHRONIC RHINITIS: ICD-10-CM

## 2020-10-08 DIAGNOSIS — H90.3 SNHL (SENSORY-NEURAL HEARING LOSS), ASYMMETRICAL: ICD-10-CM

## 2020-10-08 DIAGNOSIS — J34.89 NASAL OBSTRUCTION: ICD-10-CM

## 2020-10-08 PROCEDURE — 99214 OFFICE O/P EST MOD 30 MIN: CPT | Performed by: OTOLARYNGOLOGY

## 2020-10-08 ASSESSMENT — PAIN SCALES - GENERAL: PAINLEVEL: NO PAIN (0)

## 2020-10-08 ASSESSMENT — MIFFLIN-ST. JEOR: SCORE: 1975.91

## 2020-10-08 NOTE — PATIENT INSTRUCTIONS
Scheduling Information  To schedule your CT/MRI scan, please contact [facility] at [phone] OR [facility] at [phone]    To schedule your Surgery, please contact our Specialty Schedulers at [phone]      ENT Clinic Locations Clinic Hours Telephone Number     Leni Shabazz  [address] [place] [address]   Monday:           1:00pm -- 5:00pm    Friday:              8:00am - 12:00pm   To schedule/reschedule an appointment with   Dr. Renae,   please contact our   Specialty Scheduling Department at:     [phone]       Leni Brenner  [address] [place] [address] Tuesday:          8:00am -- 2:00pm         Urgent Care Locations Clinic Hours Telephone Numbers     Leni Brenner  [address] [place] [address]     Monday-Friday:     11:00am - 9:00pm    Saturday-Sunday:  9:00am - 5:00pm   [phone]     [facility]  [address] [place] [address]     Monday-Friday:      5:00pm - 9:00pm     Saturday-Sunday:  9:00am - 5:00pm   [phone]

## 2020-10-08 NOTE — PROGRESS NOTES
History of Present Illness - Sanjiv Shelton is a 35 year old male last seen on 6/1/20 via virtual visit, and he is status post  septoplasty, and tonsillectomy on 11/26/2019.      He had an atypical post op course. The patient looked perfect at the follow up on 12/2, but on 12/4 he acutely and spontaneously started bleeding from the LEFT side of the nose and it would not stop.  Therefore he went to [facility] and was packed bilaterally.  When he saw me on 12/9/19 I removed the packs and thankfully everything looked perfect.  He is here for another post op check and endoscopic exam.    However, at the follow up visit on 12/26/2019, things looked perfect and his symptoms were all dramatically improved.    The reason he had set up a telephone visit is that he has continued issues with a sense of pressure, as well as drainage.  He gets large jellylike blobs of mucous from his nose post nasally.  Also the RIGHT nostril does not breath as well LEFT.    I ordered a CT scan, and the mucosal disease did not seem that bad.  So I tried him on budesonide in NeilMed saline rinse, and he is here for follow up.  He tells me that since using that for a month, the drainage has been much improved, not dense thick hard yellow mucous.    Past Medical History -   Patient Active Problem List   Diagnosis     Chronic tonsillitis     Deviated nasal septum     Facial pressure     Nasal obstruction       Current Medications -   Current Outpatient Medications:      amoxicillin-clavulanate (AUGMENTIN) 875-125 MG tablet, , Disp: , Rfl:      budesonide (PULMICORT) 0.5 MG/2ML neb solution, Add one ampule into saline mixture NeilMed sinus rinse bottle or Neit Pot, and irrigate nose daily, Disp: 30 ampule, Rfl: 6    Allergies -   Allergies   Allergen Reactions     Sulfa Drugs      Allergic to sulfa eye gtts.       Social History -   Social History     Socioeconomic History     Marital status: Single     Spouse name: None     Number of  children: None     Years of education: None     Highest education level: None   Occupational History     None   Social Needs     Financial resource strain: None     Food insecurity     Worry: None     Inability: None     Transportation needs     Medical: None     Non-medical: None   Tobacco Use     Smoking status: Never Smoker     Smokeless tobacco: Never Used   Substance and Sexual Activity     Alcohol use: Yes     Alcohol/week: 2.5 standard drinks     Types: 3 Standard drinks or equivalent per week     Drug use: None     Sexual activity: None   Lifestyle     Physical activity     Days per week: None     Minutes per session: None     Stress: None   Relationships     Social connections     Talks on phone: None     Gets together: None     Attends Worship service: None     Active member of club or organization: None     Attends meetings of clubs or organizations: None     Relationship status: None     Intimate partner violence     Fear of current or ex partner: None     Emotionally abused: None     Physically abused: None     Forced sexual activity: None   Other Topics Concern     None   Social History Narrative     None       Family History - History reviewed. No pertinent family history.    Review of Systems - As per HPI and PMHx, otherwise 10+ system review of the head and neck, and general constitution is negative.    Physical Exam  /70   Pulse 59   Resp 19   Ht 1.829 m (6')   Wt 99.8 kg (220 lb)   SpO2 98%   BMI 29.84 kg/m      General - The patient is well nourished and well developed, and appears to have good nutritional status.  Alert and oriented to person and place, answers questions and cooperates with examination appropriately.   Head and Face - Normocephalic and atraumatic, with no gross asymmetry noted of the contour of the facial features.  The facial nerve is intact, with strong symmetric movements.  Voice and Breathing - The patient was breathing comfortably without the use of accessory  muscles. There was no wheezing, stridor, or stertor.  The patients voice was clear and strong, and had appropriate pitch and quality.  Ears - The tympanic membranes are normal in appearance, bony landmarks are intact.  No retraction, perforation, or masses.  No fluid or purulence was seen in the external canal or the middle ear. No evidence of infection of the middle ear or external canal, cerumen was normal in appearance.  Eyes - Extraocular movements intact, and the pupils were reactive to light.  Sclera were not icteric or injected, conjunctiva were pink and moist.  Mouth - Examination of the oral cavity showed pink, healthy oral mucosa. No lesions or ulcerations noted.  The tongue was mobile and midline, and the dentition were in good condition.    Throat - The walls of the oropharynx were smooth, pink, moist, symmetric, and had no lesions or ulcerations.  The tonsillar pillars and soft palate were symmetric.  The uvula was midline on elevation.    Neck - Normal midline excursion of the laryngotracheal complex during swallowing.  Full range of motion on passive movement.  Palpation of the occipital, submental, submandibular, internal jugular chain, and supraclavicular nodes did not demonstrate any abnormal lymph nodes or masses.  The carotid pulse was palpable bilaterally.  Palpation of the thyroid was soft and smooth, with no nodules or goiter appreciated.  The trachea was mobile and midline.  Nose - External contour is symmetric, no gross deflection or scars.  Nasal mucosa is pink and moist with no abnormal mucus.  The septum was midline and non-obstructive, turbinates of normal size and position.  No polyps, masses, or purulence noted on examination.      A/P - Sanjiv Shelton is a 35 year old male  (J34.2) Deviated nasal septum  (primary encounter diagnosis)  (J34.89) Nasal obstruction  (J31.0) Chronic rhinitis    I think we should consider a new CT sinus and see if there has been progression of  disease.    If not, I would recommend continued Budesonide in NeilMed saline rinses and/or allergy referral, as he still has RIGHT nasal congestion.  He inquires about the turbinates.    As a side note, he has a lifelong history of congenital sensorineural hearing loss in the LEFT ear.  He got a BiCROS hearing aid, but has not adapted well to it so far.

## 2020-10-08 NOTE — LETTER
10/8/2020         RE: Sanjiv Shelton  [address] [facility] [address]        Dear Colleague,    Thank you for referring your patient, Sanjiv Shelton, to the [facility]. Please see a copy of my visit note below.    History of Present Illness - Sanjiv Shelton is a 35 year old male last seen on 6/1/20 via virtual visit, and he is status post  septoplasty, and tonsillectomy on 11/26/2019.      He had an atypical post op course. The patient looked perfect at the follow up on 12/2, but on 12/4 he acutely and spontaneously started bleeding from the LEFT side of the nose and it would not stop.  Therefore he went to [facility] and was packed bilaterally.  When he saw me on 12/9/19 I removed the packs and thankfully everything looked perfect.  He is here for another post op check and endoscopic exam.    However, at the follow up visit on 12/26/2019, things looked perfect and his symptoms were all dramatically improved.    The reason he had set up a telephone visit is that he has continued issues with a sense of pressure, as well as drainage.  He gets large jellylike blobs of mucous from his nose post nasally.  Also the RIGHT nostril does not breath as well LEFT.    I ordered a CT scan, and the mucosal disease did not seem that bad.  So I tried him on budesonide in NeilMed saline rinse, and he is here for follow up.  He tells me that since using that for a month, the drainage has been much improved, not dense thick hard yellow mucous.    Past Medical History -   Patient Active Problem List   Diagnosis     Chronic tonsillitis     Deviated nasal septum     Facial pressure     Nasal obstruction       Current Medications -   Current Outpatient Medications:      amoxicillin-clavulanate (AUGMENTIN) 875-125 MG tablet, , Disp: , Rfl:      budesonide (PULMICORT) 0.5 MG/2ML neb solution, Add one ampule into saline mixture NeilMed sinus rinse bottle or Neit Pot,  and irrigate nose daily, Disp: 30 ampule, Rfl: 6    Allergies -   Allergies   Allergen Reactions     Sulfa Drugs      Allergic to sulfa eye gtts.       Social History -   Social History     Socioeconomic History     Marital status: Single     Spouse name: None     Number of children: None     Years of education: None     Highest education level: None   Occupational History     None   Social Needs     Financial resource strain: None     Food insecurity     Worry: None     Inability: None     Transportation needs     Medical: None     Non-medical: None   Tobacco Use     Smoking status: Never Smoker     Smokeless tobacco: Never Used   Substance and Sexual Activity     Alcohol use: Yes     Alcohol/week: 2.5 standard drinks     Types: 3 Standard drinks or equivalent per week     Drug use: None     Sexual activity: None   Lifestyle     Physical activity     Days per week: None     Minutes per session: None     Stress: None   Relationships     Social connections     Talks on phone: None     Gets together: None     Attends Taoist service: None     Active member of club or organization: None     Attends meetings of clubs or organizations: None     Relationship status: None     Intimate partner violence     Fear of current or ex partner: None     Emotionally abused: None     Physically abused: None     Forced sexual activity: None   Other Topics Concern     None   Social History Narrative     None       Family History - History reviewed. No pertinent family history.    Review of Systems - As per HPI and PMHx, otherwise 10+ system review of the head and neck, and general constitution is negative.    Physical Exam  /70   Pulse 59   Resp 19   Ht 1.829 m (6')   Wt 99.8 kg (220 lb)   SpO2 98%   BMI 29.84 kg/m      General - The patient is well nourished and well developed, and appears to have good nutritional status.  Alert and oriented to person and place, answers questions and cooperates with examination  appropriately.   Head and Face - Normocephalic and atraumatic, with no gross asymmetry noted of the contour of the facial features.  The facial nerve is intact, with strong symmetric movements.  Voice and Breathing - The patient was breathing comfortably without the use of accessory muscles. There was no wheezing, stridor, or stertor.  The patients voice was clear and strong, and had appropriate pitch and quality.  Ears - The tympanic membranes are normal in appearance, bony landmarks are intact.  No retraction, perforation, or masses.  No fluid or purulence was seen in the external canal or the middle ear. No evidence of infection of the middle ear or external canal, cerumen was normal in appearance.  Eyes - Extraocular movements intact, and the pupils were reactive to light.  Sclera were not icteric or injected, conjunctiva were pink and moist.  Mouth - Examination of the oral cavity showed pink, healthy oral mucosa. No lesions or ulcerations noted.  The tongue was mobile and midline, and the dentition were in good condition.    Throat - The walls of the oropharynx were smooth, pink, moist, symmetric, and had no lesions or ulcerations.  The tonsillar pillars and soft palate were symmetric.  The uvula was midline on elevation.    Neck - Normal midline excursion of the laryngotracheal complex during swallowing.  Full range of motion on passive movement.  Palpation of the occipital, submental, submandibular, internal jugular chain, and supraclavicular nodes did not demonstrate any abnormal lymph nodes or masses.  The carotid pulse was palpable bilaterally.  Palpation of the thyroid was soft and smooth, with no nodules or goiter appreciated.  The trachea was mobile and midline.  Nose - External contour is symmetric, no gross deflection or scars.  Nasal mucosa is pink and moist with no abnormal mucus.  The septum was midline and non-obstructive, turbinates of normal size and position.  No polyps, masses, or purulence  noted on examination.      A/P - Sanjiv Shelton is a 35 year old male  (J34.2) Deviated nasal septum  (primary encounter diagnosis)  (J34.89) Nasal obstruction  (J31.0) Chronic rhinitis    I think we should consider a new CT sinus and see if there has been progression of disease.    If not, I would recommend continued Budesonide in NeilMed saline rinses and/or allergy referral, as he still has RIGHT nasal congestion.  He inquires about the turbinates.    As a side note, he has a lifelong history of congenital sensorineural hearing loss in the LEFT ear.  He got a BiCROS hearing aid, but has not adapted well to it so far.        Again, thank you for allowing me to participate in the care of your patient.        Sincerely,        Jam Renae MD

## 2020-10-12 ENCOUNTER — TELEPHONE (OUTPATIENT)
Dept: OTOLARYNGOLOGY | Facility: CLINIC | Age: 35
End: 2020-10-12

## 2020-10-12 NOTE — TELEPHONE ENCOUNTER
Called pt and left vm to return call for hearing test and appt after with Md.    Milly Cr RN Specialty Triage 10/12/2020 11:07 AM

## 2020-10-12 NOTE — TELEPHONE ENCOUNTER
Called patient and left a VM message stating that Dr. Renae needs to see him in clinic.  Please call us back to schedule hearing test and affter with   Thank you     Shane Alcocer MA

## 2020-10-12 NOTE — TELEPHONE ENCOUNTER
Patient states that he has had vertigo since last Friday and he forgot to mention it to you.    Thank you.

## 2020-10-15 ENCOUNTER — OFFICE VISIT (OUTPATIENT)
Dept: AUDIOLOGY | Facility: CLINIC | Age: 35
End: 2020-10-15
Payer: COMMERCIAL

## 2020-10-15 ENCOUNTER — OFFICE VISIT (OUTPATIENT)
Dept: OTOLARYNGOLOGY | Facility: CLINIC | Age: 35
End: 2020-10-15
Payer: COMMERCIAL

## 2020-10-15 VITALS
DIASTOLIC BLOOD PRESSURE: 84 MMHG | BODY MASS INDEX: 29.8 KG/M2 | HEART RATE: 69 BPM | OXYGEN SATURATION: 98 % | RESPIRATION RATE: 18 BRPM | SYSTOLIC BLOOD PRESSURE: 132 MMHG | WEIGHT: 220 LBS | HEIGHT: 72 IN

## 2020-10-15 DIAGNOSIS — H81.10 BENIGN PAROXYSMAL POSITIONAL VERTIGO, UNSPECIFIED LATERALITY: Primary | ICD-10-CM

## 2020-10-15 DIAGNOSIS — Z53.9 ERRONEOUS ENCOUNTER--DISREGARD: Primary | ICD-10-CM

## 2020-10-15 PROCEDURE — 99213 OFFICE O/P EST LOW 20 MIN: CPT | Performed by: OTOLARYNGOLOGY

## 2020-10-15 ASSESSMENT — PAIN SCALES - GENERAL: PAINLEVEL: NO PAIN (0)

## 2020-10-15 ASSESSMENT — MIFFLIN-ST. JEOR: SCORE: 1975.91

## 2020-10-15 NOTE — PATIENT INSTRUCTIONS
Scheduling Information  To schedule your CT/MRI scan, please contact [facility] at [phone] OR [facility] at [phone]    To schedule your Surgery, please contact our Specialty Schedulers at [phone]      ENT Clinic Locations Clinic Hours Telephone Number     [facility]  [address] [place] [address]   Monday:           1:00pm -- 5:00pm    Friday:              8:00am - 12:00pm   To schedule/reschedule an appointment with   Dr. Renae,   please contact our   Specialty Scheduling Department at:     [phone]       [facility]  [address] [place] [address] Tuesday:          8:00am -- 2:00pm         Urgent Care Locations Clinic Hours Telephone Numbers     [facility]  [address] [place] [address]     Monday-Friday:     11:00am - 9:00pm    Saturday-Sunday:  9:00am - 5:00pm   [phone]     [facility]  [address] [place] [address]     Monday-Friday:      5:00pm - 9:00pm     Saturday-Sunday:  9:00am - 5:00pm   [phone]

## 2020-10-15 NOTE — PROGRESS NOTES
History of Present Illness - Sanjiv Shelton is a 35 year old male who is status post  septoplasty, and tonsillectomy on 11/26/2019.  We are awaiting a new CT sinus beffore starting any new medications for this.    However, he is here for a completely new issue, dizziness.  Once this past summer he had a sudden bout of spinning dizziness.  It happened suddenly, following some swimming and deep diving.  Initially it was a sea-legs feeling of being wobbly.  It took a week to completely going back to normal.  He was clumsy, and drifted while walking.  Once it recovered, there was no issues.    But then one week ago he was picking up his son, and suddenly had some dizziness again.  He was not able to drive, and he was starting to get nausea. From then he is not having any spins when he is still. It seems to be triggered by motion.  The greater the head rotation, the greater the illusion of motion.    Of note, he has a known LEFT congenital sensorineural hearing loss.  He had a hearing test from September 19, 2020.  The RIGHT ear is perfect, and the LEFT ear is totally non-responsive, normal tympanograms.    Past Medical History -   Patient Active Problem List   Diagnosis     Chronic tonsillitis     Deviated nasal septum     Facial pressure     Nasal obstruction     Chronic rhinitis       Current Medications -   Current Outpatient Medications:      amoxicillin-clavulanate (AUGMENTIN) 875-125 MG tablet, , Disp: , Rfl:      budesonide (PULMICORT) 0.5 MG/2ML neb solution, Add one ampule into saline mixture NeilMed sinus rinse bottle or Neit Pot, and irrigate nose daily, Disp: 30 ampule, Rfl: 6    Allergies -   Allergies   Allergen Reactions     Sulfa Drugs      Allergic to sulfa eye gtts.       Social History -   Social History     Socioeconomic History     Marital status: Single     Spouse name: None     Number of children: None     Years of education: None     Highest education level: None   Occupational  History     None   Social Needs     Financial resource strain: None     Food insecurity     Worry: None     Inability: None     Transportation needs     Medical: None     Non-medical: None   Tobacco Use     Smoking status: Never Smoker     Smokeless tobacco: Never Used   Substance and Sexual Activity     Alcohol use: Yes     Alcohol/week: 2.5 standard drinks     Types: 3 Standard drinks or equivalent per week     Drug use: None     Sexual activity: None   Lifestyle     Physical activity     Days per week: None     Minutes per session: None     Stress: None   Relationships     Social connections     Talks on phone: None     Gets together: None     Attends Alevism service: None     Active member of club or organization: None     Attends meetings of clubs or organizations: None     Relationship status: None     Intimate partner violence     Fear of current or ex partner: None     Emotionally abused: None     Physically abused: None     Forced sexual activity: None   Other Topics Concern     None   Social History Narrative     None       Family History - History reviewed. No pertinent family history.    Review of Systems - As per HPI and PMHx, otherwise 10+ system review of the head and neck, and general constitution is negative.    Physical Exam  /84   Pulse 69   Resp 18   Ht 1.829 m (6')   Wt 99.8 kg (220 lb)   SpO2 98%   BMI 29.84 kg/m      General - The patient is well nourished and well developed, and appears to have good nutritional status.  Alert and oriented to person and place, answers questions and cooperates with examination appropriately.   Head and Face - Normocephalic and atraumatic, with no gross asymmetry noted of the contour of the facial features.  The facial nerve is intact, with strong symmetric movements.  Voice and Breathing - The patient was breathing comfortably without the use of accessory muscles. There was no wheezing, stridor, or stertor.  The patients voice was clear and  strong, and had appropriate pitch and quality.  Ears - The tympanic membranes are normal in appearance, bony landmarks are intact.  No retraction, perforation, or masses.  No fluid or purulence was seen in the external canal or the middle ear. No evidence of infection of the middle ear or external canal, cerumen was normal in appearance.  Eyes - Extraocular movements intact, and the pupils were reactive to light.  Sclera were not icteric or injected, conjunctiva were pink and moist.  Mouth - Examination of the oral cavity showed pink, healthy oral mucosa. No lesions or ulcerations noted.  The tongue was mobile and midline, and the dentition were in good condition.    Throat - The walls of the oropharynx were smooth, pink, moist, symmetric, and had no lesions or ulcerations.  The tonsillar pillars and soft palate were symmetric.  The uvula was midline on elevation.    Neck - Normal midline excursion of the laryngotracheal complex during swallowing.  Full range of motion on passive movement.  Palpation of the occipital, submental, submandibular, internal jugular chain, and supraclavicular nodes did not demonstrate any abnormal lymph nodes or masses.  The carotid pulse was palpable bilaterally.  Palpation of the thyroid was soft and smooth, with no nodules or goiter appreciated.  The trachea was mobile and midline.  Nose - External contour is symmetric, no gross deflection or scars.  Nasal mucosa is pink and moist with no abnormal mucus.  The septum was midline and non-obstructive, turbinates of normal size and position.  No polyps, masses, or purulence noted on examination.  Balance evaluation - The patient was able to stand independently in the exam room without support or assistance.  With eyes closed and feet together with hands at sides, the patient was also stable and did not sway in either direction.  Standing with heel to toe and arms extended was stable and steady.  Rapid head shaking did not elicit dizziness  or nystagmus.      A/P - Sanjiv Shelton is a 35 year old male  (H81.10) Benign paroxysmal positional vertigo, unspecified laterality  (primary encounter diagnosis)    Based on today's exam and history, I think that the most likely diagnosis at this point is benign paroxysmal positional vertigo.  The etiology of benign paroxysmal positional vertigo being small crystals tumbling in the semicircular canals was discussed at length.  Also, the treatment of the problem with physical therapy for canalith repositioning maneuvers was discussed.    I will send the patient to physical therapy for this to be done.  The patient was very specifically instructed to return to me should the therapy prove unsuccessful.  In which case we will search for other possible causes, as well as discuss possible further imaging such as MRI.  As a side note, VNG/Balance testing might be challenging in him because of his congenital hearing loss in the LEFT ear.  I am not sure if that also means his LEFT vestibular system is non-functional or not.

## 2020-10-15 NOTE — LETTER
10/15/2020         RE: Sanjiv Shelton  [address] [facility] [address]        Dear Colleague,    Thank you for referring your patient, Sanjiv Shelton, to the [facility]. Please see a copy of my visit note below.    History of Present Illness - Sanjiv Shelton is a 35 year old male who is status post  septoplasty, and tonsillectomy on 11/26/2019.  We are awaiting a new CT sinus beffore starting any new medications for this.    However, he is here for a completely new issue, dizziness.  Once this past summer he had a sudden bout of spinning dizziness.  It happened suddenly, following some swimming and deep diving.  Initially it was a sea-legs feeling of being wobbly.  It took a week to completely going back to normal.  He was clumsy, and drifted while walking.  Once it recovered, there was no issues.    But then one week ago he was picking up his son, and suddenly had some dizziness again.  He was not able to drive, and he was starting to get nausea. From then he is not having any spins when he is still. It seems to be triggered by motion.  The greater the head rotation, the greater the illusion of motion.    Of note, he has a known LEFT congenital sensorineural hearing loss.  He had a hearing test from September 19, 2020.  The RIGHT ear is perfect, and the LEFT ear is totally non-responsive, normal tympanograms.    Past Medical History -   Patient Active Problem List   Diagnosis     Chronic tonsillitis     Deviated nasal septum     Facial pressure     Nasal obstruction     Chronic rhinitis       Current Medications -   Current Outpatient Medications:      amoxicillin-clavulanate (AUGMENTIN) 875-125 MG tablet, , Disp: , Rfl:      budesonide (PULMICORT) 0.5 MG/2ML neb solution, Add one ampule into saline mixture NeilMed sinus rinse bottle or Neit Pot, and irrigate nose daily, Disp: 30 ampule, Rfl: 6    Allergies -   Allergies   Allergen Reactions      Sulfa Drugs      Allergic to sulfa eye gtts.       Social History -   Social History     Socioeconomic History     Marital status: Single     Spouse name: None     Number of children: None     Years of education: None     Highest education level: None   Occupational History     None   Social Needs     Financial resource strain: None     Food insecurity     Worry: None     Inability: None     Transportation needs     Medical: None     Non-medical: None   Tobacco Use     Smoking status: Never Smoker     Smokeless tobacco: Never Used   Substance and Sexual Activity     Alcohol use: Yes     Alcohol/week: 2.5 standard drinks     Types: 3 Standard drinks or equivalent per week     Drug use: None     Sexual activity: None   Lifestyle     Physical activity     Days per week: None     Minutes per session: None     Stress: None   Relationships     Social connections     Talks on phone: None     Gets together: None     Attends Roman Catholic service: None     Active member of club or organization: None     Attends meetings of clubs or organizations: None     Relationship status: None     Intimate partner violence     Fear of current or ex partner: None     Emotionally abused: None     Physically abused: None     Forced sexual activity: None   Other Topics Concern     None   Social History Narrative     None       Family History - History reviewed. No pertinent family history.    Review of Systems - As per HPI and PMHx, otherwise 10+ system review of the head and neck, and general constitution is negative.    Physical Exam  /84   Pulse 69   Resp 18   Ht 1.829 m (6')   Wt 99.8 kg (220 lb)   SpO2 98%   BMI 29.84 kg/m      General - The patient is well nourished and well developed, and appears to have good nutritional status.  Alert and oriented to person and place, answers questions and cooperates with examination appropriately.   Head and Face - Normocephalic and atraumatic, with no gross asymmetry noted of the  contour of the facial features.  The facial nerve is intact, with strong symmetric movements.  Voice and Breathing - The patient was breathing comfortably without the use of accessory muscles. There was no wheezing, stridor, or stertor.  The patients voice was clear and strong, and had appropriate pitch and quality.  Ears - The tympanic membranes are normal in appearance, bony landmarks are intact.  No retraction, perforation, or masses.  No fluid or purulence was seen in the external canal or the middle ear. No evidence of infection of the middle ear or external canal, cerumen was normal in appearance.  Eyes - Extraocular movements intact, and the pupils were reactive to light.  Sclera were not icteric or injected, conjunctiva were pink and moist.  Mouth - Examination of the oral cavity showed pink, healthy oral mucosa. No lesions or ulcerations noted.  The tongue was mobile and midline, and the dentition were in good condition.    Throat - The walls of the oropharynx were smooth, pink, moist, symmetric, and had no lesions or ulcerations.  The tonsillar pillars and soft palate were symmetric.  The uvula was midline on elevation.    Neck - Normal midline excursion of the laryngotracheal complex during swallowing.  Full range of motion on passive movement.  Palpation of the occipital, submental, submandibular, internal jugular chain, and supraclavicular nodes did not demonstrate any abnormal lymph nodes or masses.  The carotid pulse was palpable bilaterally.  Palpation of the thyroid was soft and smooth, with no nodules or goiter appreciated.  The trachea was mobile and midline.  Nose - External contour is symmetric, no gross deflection or scars.  Nasal mucosa is pink and moist with no abnormal mucus.  The septum was midline and non-obstructive, turbinates of normal size and position.  No polyps, masses, or purulence noted on examination.  Balance evaluation - The patient was able to stand independently in the exam  room without support or assistance.  With eyes closed and feet together with hands at sides, the patient was also stable and did not sway in either direction.  Standing with heel to toe and arms extended was stable and steady.  Rapid head shaking did not elicit dizziness or nystagmus.      A/P - Sanjiv Shelton is a 35 year old male  (H81.10) Benign paroxysmal positional vertigo, unspecified laterality  (primary encounter diagnosis)    Based on today's exam and history, I think that the most likely diagnosis at this point is benign paroxysmal positional vertigo.  The etiology of benign paroxysmal positional vertigo being small crystals tumbling in the semicircular canals was discussed at length.  Also, the treatment of the problem with physical therapy for canalith repositioning maneuvers was discussed.    I will send the patient to physical therapy for this to be done.  The patient was very specifically instructed to return to me should the therapy prove unsuccessful.  In which case we will search for other possible causes, as well as discuss possible further imaging such as MRI.  As a side note, VNG/Balance testing might be challenging in him because of his congenital hearing loss in the LEFT ear.  I am not sure if that also means his LEFT vestibular system is non-functional or not.            Again, thank you for allowing me to participate in the care of your patient.        Sincerely,        Jam Renae MD

## 2020-10-20 ENCOUNTER — THERAPY VISIT (OUTPATIENT)
Dept: PHYSICAL THERAPY | Facility: CLINIC | Age: 35
End: 2020-10-20
Payer: COMMERCIAL

## 2020-10-20 DIAGNOSIS — H81.10 BENIGN PAROXYSMAL POSITIONAL VERTIGO, UNSPECIFIED LATERALITY: ICD-10-CM

## 2020-10-20 DIAGNOSIS — R42 VERTIGO: ICD-10-CM

## 2020-10-20 DIAGNOSIS — R42 DIZZINESS: ICD-10-CM

## 2020-10-20 PROCEDURE — 97161 PT EVAL LOW COMPLEX 20 MIN: CPT | Mod: GP | Performed by: PHYSICAL THERAPIST

## 2020-10-20 PROCEDURE — 97112 NEUROMUSCULAR REEDUCATION: CPT | Mod: GP | Performed by: PHYSICAL THERAPIST

## 2020-10-20 NOTE — PROGRESS NOTES
[facility] Initial Evaluation  Subjective:  HPI                    Objective:  System    Physical Exam    General     ROS     MD order 10/15/20    S:  Sanjiv is a 35 year old patient complaining of vertigo. June 2020 he was snorkeling, [place]. He got back to shore and felt like he was in a boat all day. He felt swimmy for a while and felt better after 2 weeks. Oct 9th he picked his son and turned around and felt dizzy and then the room was spinning. Since then he felt the same vertigo while driving and other activities. The intensity is less but still feels it.  Onset of symptoms: Oct 9th      Is this a recurrent problem: yes  Progression since onset: Not worse  Frequency of episodes/time of day: couple per day with head movements  Duration of episodes: few seconds  Exacerbating factors: looking up and down, rotating head  Relieving factors: rest  Previous treatments:  none  Special tests/diagnostics performed: hearing test normal  Significant medical hx: Unable to hear in his left ear. Antibiotics taken over a month ago for 10 days.  Meds: none  Occupation:    Work requirements: Sitting/ computer  General health reported by patient: Excellent  Red Flags: None      O:  Cervical ROM screen: WFL  Oculomotor/gaze stability screen: Normal  Vertebral artery test: Normal  Hallpike-Castalia maneuver:  R: Normal  L: Normal  Horizontal canal test:  R: Normal  L: Normal  Balance testing:  Normal        Assessment/Plan:    Patient is a 35 year old male with Vertigo complaints.    Patient has the following significant findings with corresponding treatment plan.                Diagnosis 1:  Vertigo/ Dizziness??  Decreased function - therapeutic activities and home program    Therapy Evaluation Codes:   1) History comprised of:   Personal factors that impact the plan of care:      Time since onset of symptoms.    Comorbidity factors that impact the plan of care are:      none.      Medications impacting care: None.  2) Examination of Body Systems comprised of:   Body structures and functions that impact the plan of care:      vertigo/ dizzness.   Activity limitations that impact the plan of care are:      Bending, Driving and repetitive head movements.  3) Clinical presentation characteristics are:   Stable/Uncomplicated.  4) Decision-Making    Low complexity using standardized patient assessment instrument and/or measureable assessment of functional outcome.  Cumulative Therapy Evaluation is: Low complexity.    Previous and current functional limitations:  (See Goal Flow Sheet for this information)    Short term and Long term goals: (See Goal Flow Sheet for this information)     Communication ability:  Patient appears to be able to clearly communicate and understand verbal and written communication and follow directions correctly.  Treatment Explanation - The following has been discussed with the patient:   RX ordered/plan of care  Anticipated outcomes  Possible risks and side effects  This patient would benefit from PT intervention to resume normal activities.   Rehab potential is good.    Frequency:  1 X month once daily  Duration:  As needed  Discharge Plan:  Achieve all LTG.  Independent in home treatment program.  Reach maximal therapeutic benefit.    Patient did not present with S/S of BPPV upon examination today. Educated and discussed about BPPV. Taught habituation exercises and advised to come to therapy if vertigo returns or symptoms does not resolve in 4 weeks. Patient in agreement with current plan.     Please refer to the daily flowsheet for treatment today, total treatment time and time spent performing 1:1 timed codes.

## 2020-12-24 PROBLEM — R42 VERTIGO: Status: RESOLVED | Noted: 2020-10-20 | Resolved: 2020-12-24

## 2020-12-24 PROBLEM — R42 DIZZINESS: Status: RESOLVED | Noted: 2020-10-20 | Resolved: 2020-12-24

## 2021-01-14 ENCOUNTER — HEALTH MAINTENANCE LETTER (OUTPATIENT)
Age: 36
End: 2021-01-14

## 2021-05-09 ENCOUNTER — HEALTH MAINTENANCE LETTER (OUTPATIENT)
Age: 36
End: 2021-05-09

## 2021-09-29 ENCOUNTER — VIRTUAL VISIT (OUTPATIENT)
Dept: CONSULT | Facility: CLINIC | Age: 36
End: 2021-09-29
Attending: GENETIC COUNSELOR, MS
Payer: COMMERCIAL

## 2021-09-29 DIAGNOSIS — H90.5 SENSORINEURAL HEARING LOSS, UNILATERAL: Primary | ICD-10-CM

## 2021-09-29 PROCEDURE — 96040 HC GENETIC COUNSELING, EACH 30 MINUTES: CPT | Mod: GT | Performed by: GENETIC COUNSELOR, MS

## 2021-09-29 NOTE — PROGRESS NOTES
Sanjiv is a 36 year old who is being evaluated via a billable video visit.      How would you like to obtain your AVS? [employer]  If the video visit is dropped, the invitation should be resent by: Text to cell phone: [phone]  Will anyone else be joining your video visit? No

## 2021-09-29 NOTE — LETTER
2021      RE: Sanjiv Shelton  [address] [facility] [address]       Sanjiv is a 36 year old who is being evaluated via a billable video visit.      How would you like to obtain your AVS? MyChart  If the video visit is dropped, the invitation should be resent by: Text to cell phone: [phone]  Will anyone else be joining your video visit? No  {If patient encounters technical issues they should call [phone] :[id number]}    Video Start Time: {video visit start/end time for provider to select:108603}      Name:  Sanjiv Shelton  :   1985  MRN:   [id number]  Date of service: Sep 29, 2021  Primary Provider: Justice Booth  Referring Provider: Jam Renae     Presenting Information: Sanjiv was seen along with with his parents for genetic counseling at the request of Dr. Renae, because Sanjiv has been diagnosed with unilateral sensorineural hearing loss.  Genetic counseling was requested to obtain family history, to discuss the genetic details of hearing loss, and to coordinate genetic testing for genes known to be associated with unilateral sensorineural hearing loss.     Sanjiv attended today's visit by himself    Medical History:  Sanjiv is a 36 year old male with a diagnosis of SNHL on left diagnosed at 4 years. His left ear is non responsive on hearing evaluations. He fell off a ladder and got a concussion at that age around time of diagnosis. Unclear if hearing loss was congenital. No prenatal or  concerns reported.     Vision/eye : normal/negative. Ophthalmology evaluation normal  Ear shape: normal/negative  Endocrine/thyroid: normal/negative  Kidney(including hematuria): normal/negative.   Skin/hair pigment differences, skin tags/pits: left sided preauricular skin tag s/p removal  Cardiac (including irregular beat, syncope, dizziness): normal/negative   Neurologic (including seizures, poor coordination): normal/negative    Anosmia: normal/negative   Development/cognition: normal/negative. Therapies?none  Major illness/infection: none    Relevant imaging/labs:   CMV- unknown  Urinalysis-  normal  CT of temporal bones- ordered, not yet completed     Family History: A three generation pedigree was obtained and scanned into the electronic medical record. The relevant portions are described below:      Children-     Son (2y)- failed new born hearing screen but passed follow up test. Negative CMV    Siblings-     Full Brother (37y)- A&W. Has two children.     His son (5y) with near total hearing loss in his right ear, which is attributed to CMV. His hearing loss was diagnosed around 2 year of age. He has developmental problems related to his CMV infection.     His daughter (2y) has normal hearing and negative CMV testing    Two half sisters - Both A&W    Mother- (66y)- pacemaker for an arrhythmia     Maternal Relatives- non-contribuatory    Father- (72y) b/l progressive hearing loss attributed to h/o playing in a band and his job as an . Also has history of thyroid problems, b/l knee replacement in 40s and problems related to alcohol use.     Paternal Relatives-     Uncle (d.75y) from prostate cancer    Male first cousin with Down syndrome (maternal age 40y at birth)    Grandmother (d.85y)  from old age    Grandfather (d.65y)  from pancreatic cancer.    Family history is otherwise largely non-contributory. There were no other reports of hearing loss, eye problems, thyroid problems, kidney problems, blood in urine, skin/hair pigment differences, skin tags/pits, irregular heartbeat, tumors, cancer <50y, connective tissue problems, abnormal NBS, neurologic problems, anosmia, 3+ miscarriages, stillborn/infant passed away, intellectual disability, developmental delay or birth defects. Maternal ancestry is [place]/Chinese and paternal ancestry is Polish/Citizen of [place]. Consanguinity was denied.     We discussed family history  of pancreatic cancer. Genetic testing is available for relatives.     Genetic Counseling Discussion:     Background:   Today, we reviewed common features of hearing loss.   We reviewed that hearing loss can have genetic (syndromic or non-syndromic), multifactorial or environmental etiology(Ex. Prenatal infection,  infection, medication exposure).     More than 50% of hearing loss is thought to have genetic causes. Many forms of genetic hearing loss are inherited in an autosomal recessive manner, which occurs when a genetic mutation is present in each of the genes in a pair; reccurrence risk for future children if a recessive genetic etiology is found is 25%.  Other times, genetic hearing loss can be due to one gene mutation that is inherited in a autosomal dominant fashion.  In this scenario, a parent may be affected or can be an asymptomatic carrier that does not have hearing loss; in these families, there would be a 50% risk for recurrence of that gene mutation in a future child. On the other hand, sometimes autosomal dominant mutations can arise sporadically in a child with neither parent having the mutation; in this scenario, the risk for recurrence in a future child is expected to be quite low.  Less commonly, hearing loss can be inherited in an X-linked or mitochondrial pattern of inheritance.     Some genetic changes lead to syndromic hearing loss, meaning that there are other medical or developmental differences in that individual that are linked to the occurrence of their hearing loss.  On other other hand, some genetic changes lead to non-syndromic hearing loss, in which only hearing is affected and no other medical or developmental differences are expected to be caused by that genetic change.        Genetic testing:  Next Generation Sequencing (NGS) allows us to rapidly sequence DNA so that the testing laboratory can read, chemical base by chemical base, through a gene looking for changes in the  instructions of the genes that might alter the function of that gene or the protein made from that gene. Using NGS, the testing laboratory can sequence large numbers of genes associated with hearing loss.  While genetic causes are more commonly identified in individuals with bilateral hearing loss comparatively, genetic mutations are also sometimes found in individuals with unilateral hearing loss.     There are three possible results from NGS:    Negative: meaning normal or no mutations are identified in the genes that were tested/sequenced    Positive: meaning a mutation that is known to be associated with a particular set of symptoms is identified    Variant of uncertain significance (VUS): meaning a change in the DNA sequence of a particular gene was seen but it is not known if it explains the symptoms. If a VUS is detected, the laboratory may request a blood sample from both parents to help clarify an individual's test results.     At times, NGS results may be difficult to interpret and may require follow up parental/family studies or may yield results that we are not able to interpret with today's information, but may be relevant in future as more clinical data is applied to genetic test results.  Finally, we reviewed that genetic testing may or may not be covered by the family's insurance and may be subject to their deductible/co-insurance.    We discussed the potential benefits of genetic testing and why this genetic testing is medically indicated. A positive result will help determine the etiology of hearing loss and may guide the medical management, particularly if a syndromic cause of hearing loss is found.  Additionally, for some genes, there is information available about expected prognosis for the hearing loss.  Also, if a genetic cause for Merons hearing loss is found, it will give us a more accurate risk assessment for other family members, particularly future children for Sanjiv's parents  and  Sanjiv's future children.     This test could turn up no definitive answers about what the underlying cause of Praveenas hearing loss is.  A negative genetic test would not entirely rule out a genetic cause for the hearing loss, as it is likely that not all the genes associated with genetic hearing loss are currently known.      Testing Logistics:  Sanjiv's personal history of unilateral sensorineural hearing loss that is non-responsive on testing is concerning for a genetic form of hearing loss, thus [facility]'s 203-gene Comprehensive Deafness Gene Pane is recommended. Sanjiv wanted to proceed with this recommended genetic testing, and therefore, consent for the comprehensive hearing loss genetic testing panel was obtained. Insurance and billing procedures were reviewed with him.     A buccal kit will be sent to the patient's house for the [facility] 203-gene Comprehensive Deafness Gene Panel. We will call the family with results when they are available.     Plan:  1.) Sanjiv wasinterested in proceeding with the [facility] 203-gene Comprehensive Deafness Gene Panel, consent obtained     2.) Buccal kit to be sent to the patient's house    3.) Will call with results    4.) Family encouraged to reach out with questions/concerns       Clarisa Osuna MS, [facility] [place]  Licensed, Certified Genetic Counselor   [facility]  Phone: [phone]  Pager: [phone]  Fax: [phone]         Time spent face-to-face: 38 minutes    cc: no letter          Clraisa Osuna GC

## 2021-09-29 NOTE — PROGRESS NOTES
Name:  Sanjiv Shelton  :   1985  MRN:   [id number]  Date of service: Sep 29, 2021  Primary Provider: Justice Booth  Referring Provider: Jam Renae     Presenting Information: Sanjiv was seen along with with his parents for genetic counseling at the request of Dr. Renae, because Sanjiv has been diagnosed with unilateral sensorineural hearing loss.  Genetic counseling was requested to obtain family history, to discuss the genetic details of hearing loss, and to coordinate genetic testing for genes known to be associated with unilateral sensorineural hearing loss.     Sanjiv attended today's visit by himself    Medical History:  Sanjiv is a 36 year old male with a diagnosis of SNHL on left diagnosed at 4 years. His left ear is non responsive on hearing evaluations. He fell off a ladder and got a concussion at that age around time of diagnosis. Unclear if hearing loss was congenital. No prenatal or  concerns reported.     Vision/eye : normal/negative. Ophthalmology evaluation normal  Ear shape: normal/negative  Endocrine/thyroid: normal/negative  Kidney(including hematuria): normal/negative.   Skin/hair pigment differences, skin tags/pits: left sided preauricular skin tag s/p removal  Cardiac (including irregular beat, syncope, dizziness): normal/negative   Neurologic (including seizures, poor coordination): normal/negative   Anosmia: normal/negative   Development/cognition: normal/negative. Therapies?none  Major illness/infection: none    Relevant imaging/labs:   CMV- unknown  Urinalysis-  normal  CT of temporal bones- ordered, not yet completed     Family History: A three generation pedigree was obtained and scanned into the electronic medical record. The relevant portions are described below:      Children-     Son (2y)- failed new born hearing screen but passed follow up test. Negative CMV    Siblings-     Full Brother (37y)- A&W. Has two children.      His son (5y) with near total hearing loss in his right ear, which is attributed to CMV. His hearing loss was diagnosed around 1 year of age. He has developmental problems related to his CMV infection.     His daughter (2y) has normal hearing and negative CMV testing    Two half sisters - Both A&W    Mother- (66y)- pacemaker for an arrhythmia     Maternal Relatives- non-contribuatory    Father- (72y) b/l progressive hearing loss attributed to h/o playing in a band and his job as an . Also has history of thyroid problems, b/l knee replacement in 40s and problems related to alcohol use.     Paternal Relatives-     Uncle (d.75y) from prostate cancer    Male first cousin with Down syndrome (maternal age 40y at birth)    Grandmother (d.85y)  from old age    Grandfather (d.65y)  from pancreatic cancer.    Family history is otherwise largely non-contributory. There were no other reports of hearing loss, eye problems, thyroid problems, kidney problems, blood in urine, skin/hair pigment differences, skin tags/pits, irregular heartbeat, tumors, cancer <50y, connective tissue problems, abnormal NBS, neurologic problems, anosmia, 3+ miscarriages, stillborn/infant passed away, intellectual disability, developmental delay or birth defects. Maternal ancestry is [place]/Norwegian and paternal ancestry is Polish/[place]. Consanguinity was denied.     We discussed family history of pancreatic cancer. Genetic testing is available for relatives.     Genetic Counseling Discussion:     Background:   Today, we reviewed common features of hearing loss.   We reviewed that hearing loss can have genetic (syndromic or non-syndromic), multifactorial or environmental etiology(Ex. Prenatal infection,  infection, medication exposure).     More than 50% of hearing loss is thought to have genetic causes. Many forms of genetic hearing loss are inherited in an autosomal recessive manner, which occurs when a genetic  mutation is present in each of the genes in a pair; reccurrence risk for future children if a recessive genetic etiology is found is 25%.  Other times, genetic hearing loss can be due to one gene mutation that is inherited in a autosomal dominant fashion.  In this scenario, a parent may be affected or can be an asymptomatic carrier that does not have hearing loss; in these families, there would be a 50% risk for recurrence of that gene mutation in a future child. On the other hand, sometimes autosomal dominant mutations can arise sporadically in a child with neither parent having the mutation; in this scenario, the risk for recurrence in a future child is expected to be quite low.  Less commonly, hearing loss can be inherited in an X-linked or mitochondrial pattern of inheritance.     Some genetic changes lead to syndromic hearing loss, meaning that there are other medical or developmental differences in that individual that are linked to the occurrence of their hearing loss.  On other other hand, some genetic changes lead to non-syndromic hearing loss, in which only hearing is affected and no other medical or developmental differences are expected to be caused by that genetic change.        Genetic testing:  Next Generation Sequencing (NGS) allows us to rapidly sequence DNA so that the testing laboratory can read, chemical base by chemical base, through a gene looking for changes in the instructions of the genes that might alter the function of that gene or the protein made from that gene. Using NGS, the testing laboratory can sequence large numbers of genes associated with hearing loss.  While genetic causes are more commonly identified in individuals with bilateral hearing loss comparatively, genetic mutations are also sometimes found in individuals with unilateral hearing loss.     There are three possible results from NGS:    Negative: meaning normal or no mutations are identified in the genes that were  tested/sequenced    Positive: meaning a mutation that is known to be associated with a particular set of symptoms is identified    Variant of uncertain significance (VUS): meaning a change in the DNA sequence of a particular gene was seen but it is not known if it explains the symptoms. If a VUS is detected, the laboratory may request a blood sample from both parents to help clarify an individual's test results.     At times, NGS results may be difficult to interpret and may require follow up parental/family studies or may yield results that we are not able to interpret with today's information, but may be relevant in future as more clinical data is applied to genetic test results.  Finally, we reviewed that genetic testing may or may not be covered by the family's insurance and may be subject to their deductible/co-insurance.    We discussed the potential benefits of genetic testing and why this genetic testing is medically indicated. A positive result will help determine the etiology of hearing loss and may guide the medical management, particularly if a syndromic cause of hearing loss is found.  Additionally, for some genes, there is information available about expected prognosis for the hearing loss.  Also, if a genetic cause for Jacqueline hearing loss is found, it will give us a more accurate risk assessment for other family members, particularly future children for Sanjiv's parents and  Sanjiv's future children.     This test could turn up no definitive answers about what the underlying cause of Fara hearing loss is.  A negative genetic test would not entirely rule out a genetic cause for the hearing loss, as it is likely that not all the genes associated with genetic hearing loss are currently known.      Testing Logistics:  Merons personal history of unilateral sensorineural hearing loss that is non-responsive on testing is concerning for a genetic form of hearing loss, thus [facility]'s 203-gene  Comprehensive Deafness Gene Pane is recommended. Sanjiv wanted to proceed with this recommended genetic testing, and therefore, consent for the comprehensive hearing loss genetic testing panel was obtained. Insurance and billing procedures were reviewed with him.     A buccal kit will be sent to the patient's house for the [facility] 203-gene Comprehensive Deafness Gene Panel. We will call the family with results when they are available.     Plan:  1.) Sanjiv wasinterested in proceeding with the [facility] 203-gene Comprehensive Deafness Gene Panel, consent obtained     2.) Buccal kit to be sent to the patient's house    3.) Will call with results    4.) Family encouraged to reach out with questions/concerns       Clarisa Osuna MS, [facility] [place]  Licensed, Certified Genetic Counselor   [facility]  Phone: [phone]  Pager: [phone]  Fax: [phone]         Time spent face-to-face: 38 minutes    cc: no letter

## 2021-09-29 NOTE — LETTER
Date:October 7, 2021      Provider requested that no letter be sent. Do not send.       [facility]

## 2021-10-24 ENCOUNTER — HEALTH MAINTENANCE LETTER (OUTPATIENT)
Age: 36
End: 2021-10-24

## 2021-12-23 ENCOUNTER — MYC MEDICAL ADVICE (OUTPATIENT)
Dept: OTOLARYNGOLOGY | Facility: CLINIC | Age: 36
End: 2021-12-23
Payer: COMMERCIAL

## 2021-12-23 NOTE — TELEPHONE ENCOUNTER
Does Dr. Renae do these types of procedures?    Sunita PADGETT RN, [facility] 12/23/21 12:23 PM

## 2022-06-05 ENCOUNTER — HEALTH MAINTENANCE LETTER (OUTPATIENT)
Age: 37
End: 2022-06-05

## 2022-07-07 ENCOUNTER — MYC MEDICAL ADVICE (OUTPATIENT)
Dept: OTOLARYNGOLOGY | Facility: CLINIC | Age: 37
End: 2022-07-07

## 2022-07-07 NOTE — LETTER
July 7, 2022      Sanjiv Shelton  [address] [facility] [address]          Dear To Whom It May Concern:    I am writing this letter of medical necessity for my patient, Sanjiv Shelton, with regards to the surgery I performed on him on 11/26/2019.  He had a tonsillectomy and nasal septoplasty.    The tonsillectomy was necessary due to a multi-year history of chronic tonsil infections, averaging 4 per year. Most of these infections resulted in missed work and need for oral antibiotics.  Removal of the tonsils successfully cured this chronic disease.    The septoplasty was performed due to chronic nasal obstruction.  He was found to have a severely deviated septum and turbinate hypertrophy on clinical examination. In my medical opinion, due to patient's past medical history of mild sleep apnea, snoring with witness of choking and gasping by bed partner, it was medically necessary to repair the deviated septum and resection of turbinates. The surgical procedures mentioned above has subjectively improved his nasal airway, sleep quality, exercise tolerance, and quality of life.      Please contact my office with any further questions.      Sincerely,    Jam Renae MD  [facility]  Department of Otolaryngology

## 2022-11-03 ENCOUNTER — VIRTUAL VISIT (OUTPATIENT)
Dept: CONSULT | Facility: CLINIC | Age: 37
End: 2022-11-03
Attending: GENETIC COUNSELOR, MS
Payer: COMMERCIAL

## 2022-11-03 VITALS — WEIGHT: 225 LBS | BODY MASS INDEX: 30.52 KG/M2

## 2022-11-03 DIAGNOSIS — Z71.83 ENCOUNTER FOR NONPROCREATIVE GENETIC COUNSELING AND TESTING: ICD-10-CM

## 2022-11-03 DIAGNOSIS — Z13.71 ENCOUNTER FOR NONPROCREATIVE GENETIC COUNSELING AND TESTING: ICD-10-CM

## 2022-11-03 DIAGNOSIS — H90.5 SENSORINEURAL HEARING LOSS, UNILATERAL: Primary | ICD-10-CM

## 2022-11-03 PROCEDURE — 999N000069 HC STATISTIC GENETIC COUNSELING, < 16 MIN: Mod: GT,95 | Performed by: GENETIC COUNSELOR, MS

## 2022-11-03 RX ORDER — TERBINAFINE HYDROCHLORIDE 250 MG/1
250 TABLET ORAL
COMMUNITY
Start: 2022-10-19

## 2022-11-03 ASSESSMENT — PAIN SCALES - GENERAL: PAINLEVEL: NO PAIN (0)

## 2022-11-03 NOTE — PROGRESS NOTES
Sanjiv Shelton  is being evaluated via a billable video visit.      How would you like to obtain your AVS? [employer]  For the video visit, send the invitation by: Text to cell phone: [phone]  Will anyone else be joining your video visit? No

## 2022-11-03 NOTE — PROGRESS NOTES
Video-Visit Details    Type of service:  Video Visit    Video Start Time (time video started): 8:03 AM    Video End Time (time video stopped): 8:17 AM    Originating Location (pt. Location): Home        Distant Location (provider location):  On-site    Mode of Communication:  Video Conference via [facility]    Physician has received verbal consent for a Video Visit from the patient? Yes      Kaity Phillips     Name:  Sanjiv Shelton  :   1985  MRN:   [id number]  Date of service: Nov 3, 2022  Referring Provider: Jam Renae MD    Genetic Counseling Consultation Note    Presenting Information:  A consultation in the [facility] was requested for Sanjiv, a 37 year old male, for evaluation of hearing loss.  This consultation was requested by Jam Renae MD in Otolaryngology at the patient's visit on 10/8/2020.    Sanjiv was accompanied to this visit conducted by video by their partner, Hallie.    History is obtained from Sanjiv and the medical record. I met with the family to update the personal and family history, discuss possible genetic contributions to his symptoms, and to obtain informed consent for genetic testing.    Personal History:   For additional details, review note on 2021 from [facility].  To summarize, Sanjiv has a history of unilateral sensorineural hearing loss diagnosed at 4 y/o (left) and a left sided preauricular skin tag s/p removal. Sanjiv reports no updates to his health since his last genetic counseling visit.    Patient Active Problem List   Diagnosis     Chronic tonsillitis     Deviated nasal septum     Facial pressure     Nasal obstruction     Chronic rhinitis     Benign paroxysmal positional vertigo, unspecified laterality     Family History:   A standard three generation pedigree was previously obtained by  [facility]. Sanjiv reports no major updates aside from a  "paternal cousin with a recent diagnosis of hereditary hemachromatosis.    Sanjiv's wife is currently undergoing carrier screening. We reviewed that we could consider screening for Sanjiv, but the optimal time would be after his partner's results come back. If partner is not determined to be a carrier for any conditions, then testing for Sanjiv could be skipped and some laboratories offer reduced pricing for partner testing. We will revisit this when Sanjiv's genetic test results for hearing loss are available.     Genetic Counseling Discussion:  We reviewed that hearing loss (HL) can have genetic (syndromic or non-syndromic), multifactorial or environmental etiology (prenatal infection,  infection, medication exposure). More than 50% of childhood-onset HL is thought to have genetic causes. Some genetic changes lead to syndromic HL, meaning that there are other medical or developmental differences in that individual that are linked to the occurrence of their HL. On other hand, some genetic changes lead to non-syndromic hearing loss, in which only hearing is affected and no other medical or developmental differences are expected to be caused by that genetic change. The diagnostic yield of NGS HL gene panel testing for unilateral HL is around 1% to 5%, with most diagnoses caused by syndromic etiologies. The diagnostic yield of NGS-based testing for unilateral HL is low because of nongenetic causes; however, it is still the preferred method because of genetic heterogeneity.    Most genetic nonsyndromic HL is inherited in a recessive pattern. To review, we have 2 copies of nearly every gene. In recessive genetic conditions, both copies of this gene must have a genetic variant or \"mutation.\" Typically parents of affected individuals have only a single variant and are unaffected; they are referred to as a \"carrier.\" There is oftentimes not a family history of recessive genetic conditions. Hearing loss " can also be due to dominant and X-linked or mitochondrial inheritance patterns.    We reviewed why genetic testing could be beneficial for Sanjiv. We may be able to learn more about why their HL occurred, provide information about expected progression and prognosis for HL, learn of other medical or developmental concerns they are at risk for, and estimate recurrence risks for the family.    At this time, we are unable to target genetic testing for a specific condition or gene for Sanjiv.  In situations like this, we recommend examining numerous genes associated with the presenting symptom.  For Sanjiv, we are targeting genes associated with hearing loss.  We discussed the details, limitations, and possible outcomes of next generation sequencing gene panels ([employer] Comprehensive Deafness Panel).  There are three types of results:    Negative: meaning no pathogenic variants were identified in the genes that were tested  o A negative result does not rule out the possibility that Sanjiv's symptoms are due to a genetic etiology  o The American College of Medical Genetics recommends follow-up every 3 years with genetics in the event of a negative genetic test result. Subtle features of syndromic forms of HL may not be apparent at birth or early in childhood but may appear as deaf or hard-of-hearing individuals grow into adulthood. Follow-up visits offer the opportunity to inform individuals about new genetic tests that may have become available or changes in the interpretation of previous test results as medical knowledge advances    Positive: meaning one (or more) pathogenic variants were identified in the genes that were tested that are associated with Sanjiv's symptoms  o We discussed that a positive result could provide an etiology to Sanjiv's symptoms, give anticipatory guidance as to their potential progression, and clarify risks to family members.  We also discussed that a positive result could  indicate that Sanjiv is at risks for other health concerns, outside of their presenting symptoms    Uncertain: meaning one (or more) variants were identified in the genes that were tested, but there is not enough data to know if this variant is disease-causing; these are called variants of uncertain significance (VUS)  o In most cases, identification of a VUS does not confirm a diagnosis and does not result in any clinically actionable recommendations.  The variant will be monitored over time to see if more information is known about it in the future.  If a VUS is identified, testing of other relatives may be helpful to provide clarification    We discussed the potential benefits of genetic testing and why this genetic testing is medically indicated. A positive result will help determine the etiology of hearing loss noted in Sanjiv and could guide medical management for Sanjiv.  If a genetic cause is found for Sanjiv, it will give us a more accurate risk assessment for other family members.  Thus, the recommended testing for Sanjiv  is DIAGNOSTIC testing, and it is NOT investigational. The American College of Medical Genetics (ACMG) recommends that a clinical genetics evaluation, including genetic counseling, should be offered to every child with confirmed hearing loss.    Plan:  1. Sanjiv expressed verbal informed consent to proceed with genetic testing ([employer] Comprehensive Deafness Panel).  I will mail a buccal collection kit to their home address.  Sanjiv will follow the included instructions and mail back to the laboratory.     The laboratory will conduct a benefits investigation for genetic testing.  If the estimated out of pocket cost is less than $100, genetic testing will commence immediately.  If the estimated out of pocket cost is greater than $100, Sanjiv will be contacted via text message asking if he would like to proceed.  Sanjiv has 7 days to respond to this message and  "stating if he would like to move forward with testing or not.  If Sanjiv does not respond to this message in 7 days, genetic testing will automatically commence. Sanjiv is aware that this is only an estimation of benefits.    2. The results of genetic testing are available ~3 weeks after the sample is received by the laboratory.  I will call Sanjiv with the results when they are available. Results will not be left via voicemail, regardless of outcome.  3. Contact information provided.    Kaity Phillips MS [facility]  Genetic Counselor  Email: [email or web address]  Phone: [phone]  Pager: [phone]    Total Time Spent in Consultation: Approximately 13 minutes    CC: No Letter    References:  Minoo Ellsworth, et al. \"Clinical evaluation and etiologic diagnosis of hearing loss: A clinical practice resource of the American College of Medical Genetics and Genomics (ACMG).\" Genetics in Medicine (2022).        "

## 2022-11-03 NOTE — LETTER
11/3/2022      RE: Sanjiv Shelton  [address] [address]     Dear Colleague,    Thank you for the opportunity to participate in the care of your patient, Sanjiv Shelton, at the [facility] at [facility]. Please see a copy of my visit note below.      Name:  Sanjiv Shelton  :   1985  MRN:   [id number]  Date of service: Nov 3, 2022  Referring Provider: Jam Renae MD    Genetic Counseling Consultation Note    Presenting Information:  A consultation in the [facility] was requested for Sanjiv, a 37 year old male, for evaluation of hearing loss.  This consultation was requested by Jam Renae MD in Otolaryngology at the patient's visit on 10/8/2020.    Sanjiv was accompanied to this visit conducted by video by their partner, Hallie.    History is obtained from Sanjiv and the medical record. I met with the family to update the personal and family history, discuss possible genetic contributions to his symptoms, and to obtain informed consent for genetic testing.    Personal History:   For additional details, review note on 2021 from [facility].  To summarize, Sanjiv has a history of unilateral sensorineural hearing loss diagnosed at 4 y/o (left) and a left sided preauricular skin tag s/p removal. Sanjiv reports no updates to his health since his last genetic counseling visit.    Patient Active Problem List   Diagnosis     Chronic tonsillitis     Deviated nasal septum     Facial pressure     Nasal obstruction     Chronic rhinitis     Benign paroxysmal positional vertigo, unspecified laterality     Family History:   A standard three generation pedigree was previously obtained by [facility]. Sanjiv reports no major updates aside from a paternal cousin with a recent diagnosis of  "hereditary hemachromatosis.    Sanjiv's wife is currently undergoing carrier screening. We reviewed that we could consider screening for Sanjiv, but the optimal time would be after his partner's results come back. If partner is not determined to be a carrier for any conditions, then testing for Sanjiv could be skipped and some laboratories offer reduced pricing for partner testing. We will revisit this when Sanjiv's genetic test results for hearing loss are available.     Genetic Counseling Discussion:  We reviewed that hearing loss (HL) can have genetic (syndromic or non-syndromic), multifactorial or environmental etiology (prenatal infection,  infection, medication exposure). More than 50% of childhood-onset HL is thought to have genetic causes. Some genetic changes lead to syndromic HL, meaning that there are other medical or developmental differences in that individual that are linked to the occurrence of their HL. On other hand, some genetic changes lead to non-syndromic hearing loss, in which only hearing is affected and no other medical or developmental differences are expected to be caused by that genetic change. The diagnostic yield of NGS HL gene panel testing for unilateral HL is around 1% to 5%, with most diagnoses caused by syndromic etiologies. The diagnostic yield of NGS-based testing for unilateral HL is low because of nongenetic causes; however, it is still the preferred method because of genetic heterogeneity.    Most genetic nonsyndromic HL is inherited in a recessive pattern. To review, we have 2 copies of nearly every gene. In recessive genetic conditions, both copies of this gene must have a genetic variant or \"mutation.\" Typically parents of affected individuals have only a single variant and are unaffected; they are referred to as a \"carrier.\" There is oftentimes not a family history of recessive genetic conditions. Hearing loss can also be due to dominant and X-linked or " mitochondrial inheritance patterns.    We reviewed why genetic testing could be beneficial for Sanjiv. We may be able to learn more about why their HL occurred, provide information about expected progression and prognosis for HL, learn of other medical or developmental concerns they are at risk for, and estimate recurrence risks for the family.    At this time, we are unable to target genetic testing for a specific condition or gene for Sanjiv.  In situations like this, we recommend examining numerous genes associated with the presenting symptom.  For Sanjiv, we are targeting genes associated with hearing loss.  We discussed the details, limitations, and possible outcomes of next generation sequencing gene panels ([employer] Comprehensive Deafness Panel).  There are three types of results:    Negative: meaning no pathogenic variants were identified in the genes that were tested  o A negative result does not rule out the possibility that Sanjiv's symptoms are due to a genetic etiology  o The American College of Medical Genetics recommends follow-up every 3 years with genetics in the event of a negative genetic test result. Subtle features of syndromic forms of HL may not be apparent at birth or early in childhood but may appear as deaf or hard-of-hearing individuals grow into adulthood. Follow-up visits offer the opportunity to inform individuals about new genetic tests that may have become available or changes in the interpretation of previous test results as medical knowledge advances    Positive: meaning one (or more) pathogenic variants were identified in the genes that were tested that are associated with Sanjiv's symptoms  o We discussed that a positive result could provide an etiology to Sanjiv's symptoms, give anticipatory guidance as to their potential progression, and clarify risks to family members.  We also discussed that a positive result could indicate that Sanjiv is at risks for other  health concerns, outside of their presenting symptoms    Uncertain: meaning one (or more) variants were identified in the genes that were tested, but there is not enough data to know if this variant is disease-causing; these are called variants of uncertain significance (VUS)  o In most cases, identification of a VUS does not confirm a diagnosis and does not result in any clinically actionable recommendations.  The variant will be monitored over time to see if more information is known about it in the future.  If a VUS is identified, testing of other relatives may be helpful to provide clarification    We discussed the potential benefits of genetic testing and why this genetic testing is medically indicated. A positive result will help determine the etiology of hearing loss noted in Sanjiv and could guide medical management for Sanjiv.  If a genetic cause is found for Sanjiv, it will give us a more accurate risk assessment for other family members.  Thus, the recommended testing for Sanjiv  is DIAGNOSTIC testing, and it is NOT investigational. The American College of Medical Genetics (ACMG) recommends that a clinical genetics evaluation, including genetic counseling, should be offered to every child with confirmed hearing loss.    Plan:  1. aSnjiv expressed verbal informed consent to proceed with genetic testing ([facility] Comprehensive Deafness Panel).  I will mail a buccal collection kit to their home address.  Sanjiv will follow the included instructions and mail back to the laboratory.     The laboratory will conduct a benefits investigation for genetic testing.  If the estimated out of pocket cost is less than $100, genetic testing will commence immediately.  If the estimated out of pocket cost is greater than $100, Sanjiv will be contacted via text message asking if he would like to proceed.  Sanjiv has 7 days to respond to this message and stating if he would like to move forward with  "testing or not.  If Sanjiv does not respond to this message in 7 days, genetic testing will automatically commence. Sanjiv is aware that this is only an estimation of benefits.    2. The results of genetic testing are available ~3 weeks after the sample is received by the laboratory.  I will call Sanjiv with the results when they are available. Results will not be left via voicemail, regardless of outcome.  3. Contact information provided.    Kaity Phillips MS [facility]  Genetic Counselor  Email: [email or web address]  Phone: [phone]  Pager: [phone]    Total Time Spent in Consultation: Approximately 13 minutes    CC: No Letter    References:  Minoo Ellsworth et al. \"Clinical evaluation and etiologic diagnosis of hearing loss: A clinical practice resource of the American College of Medical Genetics and Genomics (ACMG).\" Genetics in Medicine (2022).        Please do not hesitate to contact me if you have any questions/concerns.     Sincerely,       Kaity Phillips, GC    "

## 2022-12-21 ENCOUNTER — TELEPHONE (OUTPATIENT)
Dept: CONSULT | Facility: CLINIC | Age: 37
End: 2022-12-21

## 2022-12-21 NOTE — TELEPHONE ENCOUNTER
Called Sanjiv to review that PA submitted by [employer] for genetic testing was denied due to medical necessity. Sanjiv was not available and a non-detailed VM with contact information was left.    Kaity Phillips MS [facility]  Genetic Counselor  Email: [email or web address]  Phone: [phone]  Pager: [phone]

## 2023-03-03 ENCOUNTER — DOCUMENTATION ONLY (OUTPATIENT)
Dept: CONSULT | Facility: CLINIC | Age: 38
End: 2023-03-03
Payer: COMMERCIAL

## 2023-03-03 NOTE — PROGRESS NOTES
Received an email that Sanjiv would like to proceed with genetic testing for hearing loss via self-payment. Informed lab and asked that they send Sanjiv a new buccal kit for genetic testing.    Kaity Phillips MS [facility]  Genetic Counselor  Email: [email or web address]  Phone: [phone]  Pager: [phone]

## 2023-05-05 ENCOUNTER — TELEPHONE (OUTPATIENT)
Dept: CONSULT | Facility: CLINIC | Age: 38
End: 2023-05-05
Payer: COMMERCIAL

## 2023-05-05 DIAGNOSIS — H90.5 SENSORINEURAL HEARING LOSS, UNILATERAL: Primary | ICD-10-CM

## 2023-05-06 NOTE — TELEPHONE ENCOUNTER
I called Sanjiv to discuss the results of genetic testing.  Our initial consultation occurred on 11/3/2022 where informed consent was obtained for genetic testing.    The results of the [employer] Comprehensive Deafness Panel were uncertain. 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 benign change or pathogenic change).  Frequently, VUS are downgraded to benign variation with additional information.  For these reasons, a VUS does not establish a molecular diagnosis. [facility] considers this as VUS status as well. The (maximum) CADD score at this position is 35.    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 Treacher Gilbert syndrome is a condition that affects the development of bones and other tissues of the face. The signs and symptoms of this disorder vary greatly, ranging from almost unnoticeable to severe. Most affected individuals have underdeveloped facial bones, particularly the cheek bones, and a very small jaw and chin (micrognathia). Sanjiv examined some images of other people with Treacher Gilbert syndrome during our call and repots that he does not have typical facial features of the condition. Reviewed with Sanjiv that there can be variability with what is observed.    Personal History:   Briefly, Sanjiv has a history of unilateral sensorineural hearing loss diagnosed at 4 y/o (left)    Previous Genetic Testing  Sanjiv has had carrier testing for 3 conditions his partner was determined to be a carrier of. He was reportedly negative/normal on this testing.    Family History:   A standard three generation pedigree was previously obtained. There is no family history of similar health concerns.    Assessment:  These results do not provide an etiology to Sanjiv's history of  unilateral hearing loss or provide a diagnosis of Treacher Gilbert syndrome for him as only a variant of uncertain significance was identified.    Plan:  1. Results mailed to Sanjiv for his records.  2. Family is welcome to contact me to discuss free familial variant testing. This is offered for up to 2 family member's and Sanjiv's parents would be the recommended family members.  3. We reviewed that the classification of variants may change over time as the result of new variant interpretation guidelines and/or new information.  Sanjiv should periodically check in with genetics (~1 year via [email or web address] message or phone call) to determine if there are any updates to the classification of these variants.    Kaity Phillips MS [facility]  Genetic Counselor  Email: [email or web address]  Phone: [phone]  Pager: [phone]    Total Time Spent in Consultation: Approximately <10 minutes    CC: No Letter   DISCHARGE

## 2023-06-11 ENCOUNTER — HEALTH MAINTENANCE LETTER (OUTPATIENT)
Age: 38
End: 2023-06-11

## 2024-08-04 ENCOUNTER — HEALTH MAINTENANCE LETTER (OUTPATIENT)
Age: 39
End: 2024-08-04

## 2025-08-16 ENCOUNTER — HEALTH MAINTENANCE LETTER (OUTPATIENT)
Age: 40
End: 2025-08-16

## (undated) DEVICE — SUCTION TIP YANKAUER W/O VENT K86

## (undated) DEVICE — PACK MINOR SBA15MIFSE

## (undated) DEVICE — GOWN XLG DISP 9545

## (undated) DEVICE — ENDO SHEATH STORZ SHARPSITE ENDOSCRUB 0DEG 4MM 1912000

## (undated) DEVICE — SUCTION CANISTER MEDIVAC LINER 1500ML W/LID 65651-515

## (undated) DEVICE — GLOVE PROTEXIS W/NEU-THERA 7.5  2D73TE75

## (undated) DEVICE — ESU PENCIL W/HOLSTER

## (undated) DEVICE — SU PROLENE 2-0 FS 18" 8685H

## (undated) DEVICE — SU CHROMIC 4-0 P-3 18" 1654G

## (undated) DEVICE — DRAPE SPLIT EENT 76X124" 3X28" 9447

## (undated) DEVICE — BLADE TURBINATE INFERIOR 2MM ROTATE  1882040HR

## (undated) DEVICE — SOL WATER IRRIG 1000ML BOTTLE 07139-09

## (undated) DEVICE — ESU ELEC BLADE 2.75" COATED/INSULATED E1455

## (undated) DEVICE — SPONGE COTTONOID 1/2X3" 80-1407

## (undated) DEVICE — NDL SPINAL 25GA 3.5" QUINCKE 405180

## (undated) DEVICE — SYR 10ML FINGER CONTROL W/O NDL 309695

## (undated) DEVICE — TUBING SUCTION 10'X3/16" N510

## (undated) DEVICE — ESU SUCTION CAUTERY 10FR FOOT CONTROL E2505-10FR

## (undated) DEVICE — BASIN SET MINOR DISP

## (undated) DEVICE — ANTIFOG SOLUTION W/FOAM PAD CF-1001

## (undated) DEVICE — NDL DENTAL 27GA LONG 8881400058

## (undated) DEVICE — SU VICRYL 3-0 RB-1 27" UND J215H

## (undated) DEVICE — NDL 25GA 1.5" 305127

## (undated) DEVICE — NDL 19GA 1.5"

## (undated) DEVICE — PACK SET-UP STD 9102

## (undated) DEVICE — DRAPE SHEET HALF 40X60" 9358

## (undated) DEVICE — SPECIMEN CONTAINER 4OZ

## (undated) RX ORDER — DEXAMETHASONE SODIUM PHOSPHATE 4 MG/ML
INJECTION, SOLUTION INTRA-ARTICULAR; INTRALESIONAL; INTRAMUSCULAR; INTRAVENOUS; SOFT TISSUE
Status: DISPENSED
Start: 2019-11-26

## (undated) RX ORDER — ONDANSETRON 2 MG/ML
INJECTION INTRAMUSCULAR; INTRAVENOUS
Status: DISPENSED
Start: 2019-11-26

## (undated) RX ORDER — HYDROMORPHONE HYDROCHLORIDE 2 MG/ML
INJECTION, SOLUTION INTRAMUSCULAR; INTRAVENOUS; SUBCUTANEOUS
Status: DISPENSED
Start: 2019-11-26

## (undated) RX ORDER — FENTANYL CITRATE 50 UG/ML
INJECTION, SOLUTION INTRAMUSCULAR; INTRAVENOUS
Status: DISPENSED
Start: 2019-11-26

## (undated) RX ORDER — ACETAMINOPHEN 325 MG/1
TABLET ORAL
Status: DISPENSED
Start: 2019-11-26

## (undated) RX ORDER — PROPOFOL 10 MG/ML
INJECTION, EMULSION INTRAVENOUS
Status: DISPENSED
Start: 2019-11-26

## (undated) RX ORDER — OXYCODONE HYDROCHLORIDE 5 MG/1
TABLET ORAL
Status: DISPENSED
Start: 2019-11-26

## (undated) RX ORDER — CEFAZOLIN SODIUM 2 G/100ML
INJECTION, SOLUTION INTRAVENOUS
Status: DISPENSED
Start: 2019-11-26

## (undated) RX ORDER — DEXMEDETOMIDINE HYDROCHLORIDE 100 UG/ML
INJECTION, SOLUTION INTRAVENOUS
Status: DISPENSED
Start: 2019-11-26

## (undated) RX ORDER — GABAPENTIN 300 MG/1
CAPSULE ORAL
Status: DISPENSED
Start: 2019-11-26